# Patient Record
Sex: FEMALE | Race: WHITE | Employment: UNEMPLOYED | URBAN - METROPOLITAN AREA
[De-identification: names, ages, dates, MRNs, and addresses within clinical notes are randomized per-mention and may not be internally consistent; named-entity substitution may affect disease eponyms.]

---

## 2018-11-15 ENCOUNTER — OFFICE VISIT (OUTPATIENT)
Dept: FAMILY MEDICINE CLINIC | Age: 18
End: 2018-11-15
Payer: COMMERCIAL

## 2018-11-15 VITALS
HEIGHT: 61 IN | TEMPERATURE: 98.8 F | DIASTOLIC BLOOD PRESSURE: 60 MMHG | SYSTOLIC BLOOD PRESSURE: 102 MMHG | WEIGHT: 113.8 LBS | HEART RATE: 74 BPM | BODY MASS INDEX: 21.49 KG/M2 | OXYGEN SATURATION: 98 %

## 2018-11-15 DIAGNOSIS — N63.12 LUMP IN UPPER INNER QUADRANT OF RIGHT BREAST: ICD-10-CM

## 2018-11-15 DIAGNOSIS — Z76.89 ENCOUNTER TO ESTABLISH CARE: Primary | ICD-10-CM

## 2018-11-15 PROCEDURE — 99203 OFFICE O/P NEW LOW 30 MIN: CPT | Performed by: NURSE PRACTITIONER

## 2018-11-15 ASSESSMENT — PATIENT HEALTH QUESTIONNAIRE - PHQ9
SUM OF ALL RESPONSES TO PHQ QUESTIONS 1-9: 0
2. FEELING DOWN, DEPRESSED OR HOPELESS: 0
1. LITTLE INTEREST OR PLEASURE IN DOING THINGS: 0
SUM OF ALL RESPONSES TO PHQ QUESTIONS 1-9: 0
SUM OF ALL RESPONSES TO PHQ9 QUESTIONS 1 & 2: 0

## 2018-11-15 NOTE — PROGRESS NOTES
[facility] [place] [address], [address] [place]  [phone]    Najma Hoffman is a 19 y.o. female who presents today for her  medical conditions/complaints as noted below. Najma Hoffman is c/o of Establish Care  . HPI:     HPI   Pt is here to establish    Noticed a lump in her right breast- this has been occurring for a year. she told her gynecologist, she feels it has worsened since she told her. She feels it has gotten bigger and it is tender. No difference in the color or texture of skin. No discharge from nipple. Periods are irregular- always have been irregular. States sometimes she skips a period. Periods last about a week. Heavy bleeding when they start and then a moderate amt. Not using any birth control. Not currently sexually active. Went to [employer], did not graduate- working on getting her diploma right now online. Diet ok  Not currently working      Nursing note reviewed and discussed with patient. Patient's medications, allergies, past medical, surgical, social and family histories were reviewed and updated asappropriate. No current outpatient prescriptions on file. No current facility-administered medications for this visit. Past Medical History:   Diagnosis Date    Allergic rhinitis     Anxiety     Asthma     Depression       History reviewed. No pertinent surgical history. Family History   Problem Relation Age of Onset    Asthma Mother     Diabetes Mother     Cancer Maternal Aunt     Cancer Maternal Grandfather         lung    Cancer Paternal Grandmother         lung     Social History   Substance Use Topics    Smoking status: Current Every Day Smoker     Packs/day: 1.00     Years: 3.00    Smokeless tobacco: Never Used    Alcohol use No      No Known Allergies    Subjective:      Review of Systems   Constitutional: Negative for activity change, appetite change, chills, fatigue and fever.    HENT: Negative for

## 2018-11-25 ASSESSMENT — ENCOUNTER SYMPTOMS
DIARRHEA: 0
BLOOD IN STOOL: 0
ABDOMINAL PAIN: 0
SINUS PRESSURE: 0
CONSTIPATION: 0
CHEST TIGHTNESS: 0
EYE DISCHARGE: 0
COUGH: 0
SHORTNESS OF BREATH: 0
RHINORRHEA: 0

## 2018-11-29 DIAGNOSIS — N63.12 LUMP IN UPPER INNER QUADRANT OF RIGHT BREAST: ICD-10-CM

## 2018-12-24 RX ORDER — ALBUTEROL SULFATE 90 UG/1
2 AEROSOL, METERED RESPIRATORY (INHALATION) EVERY 4 HOURS PRN
Qty: 1 INHALER | Refills: 1 | Status: SHIPPED | OUTPATIENT
Start: 2018-12-24 | End: 2019-01-10 | Stop reason: SDUPTHER

## 2019-01-10 ENCOUNTER — OFFICE VISIT (OUTPATIENT)
Dept: FAMILY MEDICINE CLINIC | Age: 19
End: 2019-01-10
Payer: COMMERCIAL

## 2019-01-10 ENCOUNTER — HOSPITAL ENCOUNTER (OUTPATIENT)
Age: 19
Setting detail: SPECIMEN
Discharge: HOME | End: 2019-01-10
Payer: COMMERCIAL

## 2019-01-10 VITALS
BODY MASS INDEX: 20.47 KG/M2 | HEART RATE: 89 BPM | HEIGHT: 61 IN | SYSTOLIC BLOOD PRESSURE: 106 MMHG | DIASTOLIC BLOOD PRESSURE: 70 MMHG | WEIGHT: 108.4 LBS | OXYGEN SATURATION: 98 % | TEMPERATURE: 98.6 F

## 2019-01-10 DIAGNOSIS — N93.9 VAGINAL SPOTTING: Primary | ICD-10-CM

## 2019-01-10 DIAGNOSIS — B96.89 BACTERIAL VAGINOSIS: Primary | ICD-10-CM

## 2019-01-10 DIAGNOSIS — N89.8 VAGINAL DISCHARGE: ICD-10-CM

## 2019-01-10 DIAGNOSIS — B37.9 YEAST INFECTION: ICD-10-CM

## 2019-01-10 DIAGNOSIS — N76.0 BACTERIAL VAGINOSIS: Primary | ICD-10-CM

## 2019-01-10 LAB
BILIRUBIN, POC: NEGATIVE
BLOOD URINE, POC: ABNORMAL
CLARITY, POC: CLEAR
COLOR, POC: YELLOW
CONTROL: NORMAL
DIRECT EXAM: ABNORMAL
GLUCOSE URINE, POC: NEGATIVE
KETONES, POC: NEGATIVE
LEUKOCYTE EST, POC: NEGATIVE
Lab: ABNORMAL
NITRITE, POC: NEGATIVE
PH, POC: 7
PREGNANCY TEST URINE, POC: NEGATIVE
PROTEIN, POC: NEGATIVE
SPECIFIC GRAVITY, POC: 1.01
SPECIMEN DESCRIPTION: ABNORMAL
STATUS: ABNORMAL
UROBILINOGEN, POC: 0.2

## 2019-01-10 PROCEDURE — 81025 URINE PREGNANCY TEST: CPT | Performed by: NURSE PRACTITIONER

## 2019-01-10 PROCEDURE — 81003 URINALYSIS AUTO W/O SCOPE: CPT | Performed by: NURSE PRACTITIONER

## 2019-01-10 PROCEDURE — 99213 OFFICE O/P EST LOW 20 MIN: CPT | Performed by: NURSE PRACTITIONER

## 2019-01-10 RX ORDER — METRONIDAZOLE 500 MG/1
500 TABLET ORAL 2 TIMES DAILY
Qty: 14 TABLET | Refills: 0 | Status: SHIPPED | OUTPATIENT
Start: 2019-01-10 | End: 2019-01-17

## 2019-01-10 RX ORDER — FLUCONAZOLE 150 MG/1
TABLET ORAL
Qty: 2 TABLET | Refills: 0 | Status: SHIPPED | OUTPATIENT
Start: 2019-01-10 | End: 2019-02-15 | Stop reason: ALTCHOICE

## 2019-01-10 ASSESSMENT — ENCOUNTER SYMPTOMS
ABDOMINAL PAIN: 1
CONSTIPATION: 0
BACK PAIN: 0
VOMITING: 0
NAUSEA: 0
DIARRHEA: 0
SORE THROAT: 0

## 2019-01-11 LAB
C TRACH DNA GENITAL QL NAA+PROBE: NEGATIVE
N. GONORRHOEAE DNA: NEGATIVE

## 2019-01-11 RX ORDER — ALBUTEROL SULFATE 90 UG/1
2 AEROSOL, METERED RESPIRATORY (INHALATION) EVERY 4 HOURS PRN
Qty: 1 INHALER | Refills: 2 | Status: SHIPPED | OUTPATIENT
Start: 2019-01-11 | End: 2021-01-27 | Stop reason: SDUPTHER

## 2019-02-15 ENCOUNTER — OFFICE VISIT (OUTPATIENT)
Dept: FAMILY MEDICINE CLINIC | Age: 19
End: 2019-02-15
Payer: COMMERCIAL

## 2019-02-15 VITALS
BODY MASS INDEX: 19.86 KG/M2 | SYSTOLIC BLOOD PRESSURE: 112 MMHG | DIASTOLIC BLOOD PRESSURE: 74 MMHG | OXYGEN SATURATION: 98 % | HEART RATE: 108 BPM | WEIGHT: 105.8 LBS | TEMPERATURE: 98.7 F | RESPIRATION RATE: 16 BRPM

## 2019-02-15 DIAGNOSIS — R68.89 FLU-LIKE SYMPTOMS: ICD-10-CM

## 2019-02-15 DIAGNOSIS — K52.9 ACUTE GASTROENTERITIS: Primary | ICD-10-CM

## 2019-02-15 DIAGNOSIS — M26.629 TMJ PAIN DYSFUNCTION SYNDROME: ICD-10-CM

## 2019-02-15 LAB
INFLUENZA A ANTIBODY: NEGATIVE
INFLUENZA B ANTIBODY: NORMAL

## 2019-02-15 PROCEDURE — 87804 INFLUENZA ASSAY W/OPTIC: CPT | Performed by: INTERNAL MEDICINE

## 2019-02-15 PROCEDURE — 99213 OFFICE O/P EST LOW 20 MIN: CPT | Performed by: INTERNAL MEDICINE

## 2019-02-15 RX ORDER — ONDANSETRON 4 MG/1
4 TABLET, ORALLY DISINTEGRATING ORAL EVERY 8 HOURS PRN
Qty: 20 TABLET | Refills: 0 | Status: SHIPPED | OUTPATIENT
Start: 2019-02-15 | End: 2021-02-08

## 2019-02-15 ASSESSMENT — ENCOUNTER SYMPTOMS
BLOATING: 0
ABDOMINAL PAIN: 1
FLATUS: 0
DIARRHEA: 1
COUGH: 0
VOMITING: 1

## 2021-01-27 ENCOUNTER — OFFICE VISIT (OUTPATIENT)
Dept: FAMILY MEDICINE CLINIC | Age: 21
End: 2021-01-27
Payer: MEDICAID

## 2021-01-27 VITALS
WEIGHT: 145 LBS | BODY MASS INDEX: 27.38 KG/M2 | HEIGHT: 61 IN | DIASTOLIC BLOOD PRESSURE: 60 MMHG | OXYGEN SATURATION: 97 % | HEART RATE: 83 BPM | TEMPERATURE: 97.2 F | SYSTOLIC BLOOD PRESSURE: 100 MMHG

## 2021-01-27 DIAGNOSIS — N92.6 IRREGULAR MENSES: ICD-10-CM

## 2021-01-27 DIAGNOSIS — J45.20 MILD INTERMITTENT ASTHMA WITHOUT COMPLICATION: ICD-10-CM

## 2021-01-27 DIAGNOSIS — M54.50 LUMBAR PAIN: Primary | ICD-10-CM

## 2021-01-27 PROCEDURE — 99213 OFFICE O/P EST LOW 20 MIN: CPT | Performed by: NURSE PRACTITIONER

## 2021-01-27 RX ORDER — ALBUTEROL SULFATE 90 UG/1
2 AEROSOL, METERED RESPIRATORY (INHALATION) EVERY 4 HOURS PRN
Qty: 1 INHALER | Refills: 2 | Status: SHIPPED | OUTPATIENT
Start: 2021-01-27 | End: 2021-10-21 | Stop reason: SDUPTHER

## 2021-01-27 SDOH — ECONOMIC STABILITY: TRANSPORTATION INSECURITY
IN THE PAST 12 MONTHS, HAS LACK OF TRANSPORTATION KEPT YOU FROM MEETINGS, WORK, OR FROM GETTING THINGS NEEDED FOR DAILY LIVING?: NO

## 2021-01-27 SDOH — ECONOMIC STABILITY: INCOME INSECURITY: HOW HARD IS IT FOR YOU TO PAY FOR THE VERY BASICS LIKE FOOD, HOUSING, MEDICAL CARE, AND HEATING?: NOT HARD AT ALL

## 2021-01-27 SDOH — ECONOMIC STABILITY: FOOD INSECURITY: WITHIN THE PAST 12 MONTHS, THE FOOD YOU BOUGHT JUST DIDN'T LAST AND YOU DIDN'T HAVE MONEY TO GET MORE.: NEVER TRUE

## 2021-01-27 SDOH — ECONOMIC STABILITY: FOOD INSECURITY: WITHIN THE PAST 12 MONTHS, YOU WORRIED THAT YOUR FOOD WOULD RUN OUT BEFORE YOU GOT MONEY TO BUY MORE.: NEVER TRUE

## 2021-01-27 ASSESSMENT — ENCOUNTER SYMPTOMS
COUGH: 0
SHORTNESS OF BREATH: 0

## 2021-01-27 ASSESSMENT — PATIENT HEALTH QUESTIONNAIRE - PHQ9
SUM OF ALL RESPONSES TO PHQ9 QUESTIONS 1 & 2: 0
SUM OF ALL RESPONSES TO PHQ QUESTIONS 1-9: 0

## 2021-01-27 NOTE — PROGRESS NOTES
[address] [place]  [phone]    Rhonda Bartlett is a 23 y.o. female who presents today for her  medical conditions/complaints as noted below. Rhonda Bartlett is c/o of Medication Refill    HPI:     HPI  Asthma:  Current treatment includes beta agonist inhalers. Using preventive medication(s) consistently: not applicable. Residual symptoms: none. Patient denies any other symptoms. She requires her rescue inhaler 2 time(s) per month. Pt has been having back pain for a year. She went to the er last marhc, she had an mri that showed mild central canal stenosis at l4-l5. She had seen a neurosurgeon but states their office is closed d/t covid. She is still having pain but it is not as bad as it was. She would like a referral to a new ob/gyn. Nursing note reviewed and discussed with patient. Patient's medications, allergies, past medical, surgical, social and family histories were reviewed and updated asappropriate. Current Outpatient Medications   Medication Sig Dispense Refill    albuterol sulfate HFA (PROVENTIL HFA) 108 (90 Base) MCG/ACT inhaler Inhale 2 puffs into the lungs every 4 hours as needed for Wheezing 1 Inhaler 2    ondansetron (ZOFRAN ODT) 4 MG disintegrating tablet Take 1 tablet by mouth every 8 hours as needed for Nausea or Vomiting (Patient not taking: Reported on 1/27/2021) 20 tablet 0     No current facility-administered medications for this visit. Past Medical History:   Diagnosis Date    Allergic rhinitis     Anxiety     Asthma     Depression       No past surgical history on file.   Family History   Problem Relation Age of Onset    Asthma Mother     Diabetes Mother     Cancer Maternal Aunt     Cancer Maternal Grandfather         lung    Cancer Paternal Grandmother         lung     Social History     Tobacco Use    Smoking status: Current Every Day Smoker     Packs/day: 1.00     Years: 3.00     Pack voiced understanding. Reviewed health maintenance. Instructed to continue current medications, diet andexercise. 1.  Dori Areas received counseling on the following healthy behaviors: nutrition, exercise and medication adherence  2. Patient given educationalmaterials when available - see patient instructions when applicable  3. Discussed use, benefit, and side effects of prescribed medications. Barriersto medication compliance addressed. All patient questions answered. Pt voiced understanding. 4.  Reviewed prior labs and health maintenance when applicable. 5.  Continue current medications, diet and exercise. CompletedRefills   Requested Prescriptions     Signed Prescriptions Disp Refills    albuterol sulfate HFA (PROVENTIL HFA) 108 (90 Base) MCG/ACT inhaler 1 Inhaler 2     Sig: Inhale 2 puffs into the lungs every 4 hours as needed for Wheezing       This note was transcribed using dictation with Dragon services. Efforts were made to correct any errors but some words may be misinterpreted.     Electronically signed by Donny Mullen CNP on 1/27/2021 at 8:30 AM

## 2021-01-27 NOTE — PROGRESS NOTES
Patient is present for med refills    Patient states she has been having lower back pain  States she saw a specialist for this and was told to get xrays and MRI (in care everywhere)  States this has been going on for months

## 2021-02-03 ENCOUNTER — HOSPITAL ENCOUNTER (OUTPATIENT)
Dept: PHYSICAL THERAPY | Age: 21
Setting detail: THERAPIES SERIES
Discharge: HOME | End: 2021-02-03
Payer: COMMERCIAL

## 2021-02-03 NOTE — FLOWSHEET NOTE
[x] [facility] ([facility]) [facility] &  Therapy  [address]    [phone]  F: [phone]   [] [address]  [facility] [address]   [address]  P: [phone]  F: [phone]  [] [address] &  Therapy  [address]  P: [phone]  F: [phone] [] [address]  P: [phone]  F: [phone]  [] [address]  [address] [facility] [address]   [address]   [place]: [phone]  F: [phone]   [] [facility]  [address] [facility] [address]  [place]: [phone]   F: [phone]     Physical Therapy Cancel/No Show note    Date: 2/3/2021  Patient: Shankar De La Garza  : 2000  MRN: [id number]    Cancels/No Shows to date:     For today's appointment patient:    [x]  Cancelled    [] Rescheduled appointment    [] No-show     Reason given by patient:    []  Patient ill    []  Conflicting appointment    [] No transportation      [] Conflict with work    [] No reason given    [] Weather related    [] COVID-19    [x] Other:      Comments:  Pt.  Has Theramatrix which is OON    [] Next appointment was confirmed    Electronically signed by: Belgica Mohr

## 2021-02-08 ENCOUNTER — OFFICE VISIT (OUTPATIENT)
Dept: OBGYN CLINIC | Age: 21
End: 2021-02-08
Payer: COMMERCIAL

## 2021-02-08 VITALS
SYSTOLIC BLOOD PRESSURE: 124 MMHG | DIASTOLIC BLOOD PRESSURE: 84 MMHG | WEIGHT: 146 LBS | HEIGHT: 61 IN | BODY MASS INDEX: 27.56 KG/M2

## 2021-02-08 DIAGNOSIS — N63.0 BREAST LUMP IN FEMALE: ICD-10-CM

## 2021-02-08 DIAGNOSIS — N64.4 BREAST PAIN IN FEMALE: Primary | ICD-10-CM

## 2021-02-08 DIAGNOSIS — Z76.89 ENCOUNTER TO ESTABLISH CARE WITH NEW DOCTOR: ICD-10-CM

## 2021-02-08 PROCEDURE — 99213 OFFICE O/P EST LOW 20 MIN: CPT | Performed by: STUDENT IN AN ORGANIZED HEALTH CARE EDUCATION/TRAINING PROGRAM

## 2021-02-08 NOTE — PROGRESS NOTES
[facility] OB/GYN   [facility] [address] [address] [facility], [facility] [address]    Problem Visit      Jaimee Rojas  2023                       Primary Care Physician: SHANE Mari CNP    CC:   Chief Complaint   Patient presents with   [facility] Establish Care    Breast Pain     Both breasts tender when touched, L has a small lump and nothing noticible on R breast. This has been going for a while. HPI: Jaimee Rojas is a 23 y.o. female [facility] Patient's last menstrual period was 2021. The patient was seen and examined. She is here for breast discomfort and is complaining of a breast lump on her left breast she noticed about 2 months ago. Patient has a history of breast discomfort in the past and has had negative ultrasound results. Patient denies any nipple discharge. Patient had an x-ray done a few months ago which was negative. Explained to patient that this discomfort may also be due to costochondritis. Recommend Motrin and Tylenol as needed for discomfort.       REVIEW OF SYSTEMS:  Constitutional: negative fever, negative chills  HEENT: negative visual disturbances, negative headaches  Respiratory: negative dyspnea, negative cough  Cardiovascular: negative chest pain,  negative palpitations  Gastrointestinal: negative abdominal pain, negative RUQ pain, negative N/V, negative diarrhea, negative constipation  Genitourinary: negative dysuria, negative vaginal discharge  Dermatological: negative rash  Hematologic: negative bruising  Immunologic/Lymphatic: negative recent illness, negative recent sick contact  Musculoskeletal: negative back pain, negative myalgias, negative arthralgias, positive musculoskeletal chest pain and breast pain  Neurological:  negative dizziness, negative weakness  Behavior/Psych: negative depression, negative anxiety    OBSTETRICAL HISTORY:  OB History    Para Term  AB Living   0 0 0 0 0 0   SAB TAB Ectopic Molar Multiple Live Births   0 0 0 0 0 0 PAST MEDICAL HISTORY:      Diagnosis Date    Allergic rhinitis     Anxiety     Asthma     Depression        PAST SURGICAL HISTORY:                                                                    Procedure Laterality Date    TONSILLECTOMY         MEDICATIONS:  Current Outpatient Medications   Medication Sig Dispense Refill    albuterol sulfate HFA (PROVENTIL HFA) 108 (90 Base) MCG/ACT inhaler Inhale 2 puffs into the lungs every 4 hours as needed for Wheezing 1 Inhaler 2     No current facility-administered medications for this visit. ALLERGIES:   Allergies as of 02/08/2021    (No Known Allergies)                                   VITALS:  Vitals:    02/08/21 1133   BP: 124/84   Position: Sitting   Cuff Size: Medium Adult   Weight: 146 lb (66.2 kg)   Height: 5' 1\" (1.549 m)                                                                                                                                                                    PHYSICAL EXAM:   General Appearance: Appears healthy. Alert; in no acute distress. Pleasant. Skin: Skin color, texture, turgor normal. No rashes or lesions. HEENT: normocephalic and atraumatic, Thyroid normal to inspection and palpation  Respiratory: Normal expansion. Clear to auscultation. No rales, rhonchi, or wheezing. Cardiovascular: normal rate, normal S1 and S2, no gallops, intact distal pulses and no carotid bruits  Breast:  (Chest): normal appearance, tenderness to palpation in bilateral breasts. Roughly 1 cm mobile mass palpated around 10 O'clock position of left breast.  Abdomen: soft, non-tender, non-distended, no right upper quadrant tenderness and no CVA tenderness  Pelvic Exam: Not indicated  Rectal Exam: exam declined by patient  Musculoskeletal: no gross abnormalities  Extremities: non-tender BLE and non-edematous  Psych:  oriented to time, place and person       DATA:  No results found for this visit on 02/08/21.     ASSESSMENT & PLAN:    Clarice Maguire Magalis Slater is a 23 y.o. female Yuriy Mallory Patient's last menstrual period was 01/08/2021. Breast pain/Breast lump   - Bilateral breast US ordered   - Motrin/Tylenol for discomfort    Patient Active Problem List    Diagnosis Date Noted    Breast pain in female 02/08/2021    Breast lump in female 02/08/2021       Return in about 3 months (around 5/8/2021) for Annual.    Counseling Completed:    Discussed need for repeat pap as per American Society for Colposcopy and Cervical Pathology guidelines. Discussed need for mammograms every 1 year, If >44 yo and last mammogram was negative. Discussed Calcium and Vitamin D dosing. Discussed need for colonoscopy screening as well as onset for bone density testing. Discussed birth control and barrier recommendations. Discussed STD counseling and prevention. Discussed Gardisil counseling for all patients 10-35 yo. Hereditary Breast, Ovarian, Colon and Uterine Cancer screening discussed. Tobacco & Secondary smoke risks discussed; with recommendation for cessation and avoidance. Routine health maintenance per patients PCP discussed. Patient was seen with total face to face time of 15 minutes. More than 50% of this visit was on counseling and education regarding her diagnose(s) as listed below and options. She was also counseled on her preventative health maintenance recommendations and follow-up. Diagnosis Orders   1. Breast pain in female  Franny 47 RIGHT   2. Encounter to establish care with new doctor     3.  Breast lump in female           Marisel Estrada, [address], [address]  2/8/2021, 12:15 PM

## 2021-02-23 ENCOUNTER — HOSPITAL ENCOUNTER (OUTPATIENT)
Dept: WOMENS IMAGING | Age: 21
Discharge: HOME | End: 2021-02-25
Payer: COMMERCIAL

## 2021-02-23 DIAGNOSIS — N64.4 BREAST PAIN IN FEMALE: ICD-10-CM

## 2021-02-23 PROCEDURE — 76642 ULTRASOUND BREAST LIMITED: CPT

## 2021-10-21 ENCOUNTER — OFFICE VISIT (OUTPATIENT)
Dept: FAMILY MEDICINE CLINIC | Age: 21
End: 2021-10-21
Payer: COMMERCIAL

## 2021-10-21 ENCOUNTER — HOSPITAL ENCOUNTER (OUTPATIENT)
Age: 21
Setting detail: SPECIMEN
Discharge: HOME | End: 2021-10-21
Payer: COMMERCIAL

## 2021-10-21 VITALS
BODY MASS INDEX: 29.55 KG/M2 | DIASTOLIC BLOOD PRESSURE: 70 MMHG | SYSTOLIC BLOOD PRESSURE: 110 MMHG | OXYGEN SATURATION: 97 % | WEIGHT: 156.4 LBS | HEART RATE: 89 BPM

## 2021-10-21 DIAGNOSIS — J45.20 MILD INTERMITTENT ASTHMA WITHOUT COMPLICATION: ICD-10-CM

## 2021-10-21 DIAGNOSIS — R30.0 DYSURIA: Primary | ICD-10-CM

## 2021-10-21 DIAGNOSIS — R30.0 DYSURIA: ICD-10-CM

## 2021-10-21 DIAGNOSIS — J30.1 SEASONAL ALLERGIC RHINITIS DUE TO POLLEN: ICD-10-CM

## 2021-10-21 LAB
BILIRUBIN, POC: ABNORMAL
BLOOD URINE, POC: ABNORMAL
CLARITY, POC: ABNORMAL
COLOR, POC: ABNORMAL
GLUCOSE URINE, POC: ABNORMAL
KETONES, POC: ABNORMAL
LEUKOCYTE EST, POC: ABNORMAL
NITRITE, POC: ABNORMAL
PH, POC: 6.5
PROTEIN, POC: 30
SPECIFIC GRAVITY, POC: 1.02
UROBILINOGEN, POC: 0.2

## 2021-10-21 PROCEDURE — 81002 URINALYSIS NONAUTO W/O SCOPE: CPT | Performed by: NURSE PRACTITIONER

## 2021-10-21 PROCEDURE — 99213 OFFICE O/P EST LOW 20 MIN: CPT | Performed by: NURSE PRACTITIONER

## 2021-10-21 RX ORDER — ALBUTEROL SULFATE 90 UG/1
2 AEROSOL, METERED RESPIRATORY (INHALATION) EVERY 4 HOURS PRN
Qty: 1 EACH | Refills: 5 | Status: SHIPPED | OUTPATIENT
Start: 2021-10-21 | End: 2022-03-18

## 2021-10-21 RX ORDER — NITROFURANTOIN 25; 75 MG/1; MG/1
100 CAPSULE ORAL 2 TIMES DAILY
Qty: 14 CAPSULE | Refills: 0 | Status: SHIPPED | OUTPATIENT
Start: 2021-10-21 | End: 2021-10-28

## 2021-10-21 RX ORDER — CETIRIZINE HYDROCHLORIDE 10 MG/1
10 TABLET ORAL DAILY
Qty: 30 TABLET | Refills: 3 | Status: SHIPPED | OUTPATIENT
Start: 2021-10-21 | End: 2021-11-20

## 2021-10-21 ASSESSMENT — ENCOUNTER SYMPTOMS
SINUS PRESSURE: 0
ABDOMINAL PAIN: 0
SHORTNESS OF BREATH: 0
BACK PAIN: 0
NAUSEA: 0
SINUS PAIN: 0
CHEST TIGHTNESS: 0
VOMITING: 0
DIARRHEA: 0
EYE PAIN: 0
CONSTIPATION: 0
COLOR CHANGE: 0

## 2021-10-21 NOTE — PROGRESS NOTES
Allergic rhinitis     Anxiety     Asthma     Depression       Past Surgical History:   Procedure Laterality Date    TONSILLECTOMY       Family History   Problem Relation Age of Onset    Asthma Mother     Diabetes Mother     Cancer Maternal Aunt     Cancer Maternal Grandfather         lung    Cancer Paternal Grandmother         lung     Social History     Tobacco Use    Smoking status: Current Every Day Smoker     Packs/day: 1.00     Years: 3.00     Pack years: 3.00    Smokeless tobacco: Never Used   Substance Use Topics    Alcohol use: No      No Known Allergies    Subjective:      Review of Systems   Constitutional: Negative for activity change, chills, fatigue and unexpected weight change. HENT: Negative for hearing loss, postnasal drip, sinus pressure and sinus pain. Eyes: Negative for pain and visual disturbance. Respiratory: Negative for chest tightness and shortness of breath. Cardiovascular: Negative for chest pain and palpitations. Gastrointestinal: Negative for abdominal pain, constipation, diarrhea, nausea and vomiting. Endocrine: Negative for polydipsia, polyphagia and polyuria. Genitourinary: Positive for frequency, hematuria and urgency. Negative for difficulty urinating, dysuria, flank pain, vaginal bleeding, vaginal discharge and vaginal pain. Musculoskeletal: Negative for arthralgias, back pain and myalgias. Skin: Negative for color change and rash. Neurological: Negative for dizziness, weakness, light-headedness, numbness and headaches. Psychiatric/Behavioral: Negative for confusion, hallucinations, self-injury, sleep disturbance and suicidal ideas. The patient is not nervous/anxious.       Other pertinent ROS in HPI  Objective:     /70 (Site: Left Upper Arm, Position: Sitting, Cuff Size: Large Adult)   Pulse 89   Wt 156 lb 6.4 oz (70.9 kg)   SpO2 97%   BMI 29.55 kg/m²      Wt Readings from Last 3 Encounters:   10/21/21 156 lb 6.4 oz (70.9 kg)   02/08/21 146 lb (66.2 kg)   01/27/21 145 lb (65.8 kg)     Temp Readings from Last 3 Encounters:   01/27/21 97.2 °F (36.2 °C)   02/15/19 98.7 °F (37.1 °C) (Oral)   01/10/19 98.6 °F (37 °C)     BP Readings from Last 3 Encounters:   10/21/21 110/70   02/08/21 124/84   01/27/21 100/60     Pulse Readings from Last 3 Encounters:   10/21/21 89   01/27/21 83   02/15/19 108       Physical Exam  Vitals reviewed. Constitutional:       Appearance: Normal appearance. HENT:      Head: Normocephalic. Right Ear: Tympanic membrane normal.      Left Ear: Tympanic membrane normal.      Nose: Nose normal.      Mouth/Throat:      Mouth: Mucous membranes are moist.      Pharynx: Oropharynx is clear. Eyes:      Extraocular Movements: Extraocular movements intact. Conjunctiva/sclera: Conjunctivae normal.      Pupils: Pupils are equal, round, and reactive to light. Cardiovascular:      Rate and Rhythm: Normal rate and regular rhythm. Pulses: Normal pulses. Heart sounds: Normal heart sounds. Pulmonary:      Effort: Pulmonary effort is normal.      Breath sounds: Normal breath sounds and air entry. No decreased breath sounds, wheezing, rhonchi or rales. Abdominal:      General: Abdomen is flat. Bowel sounds are normal.      Palpations: Abdomen is soft. Tenderness: There is no right CVA tenderness or left CVA tenderness. Genitourinary:     Rectum: Normal.   Musculoskeletal:         General: Normal range of motion. Cervical back: Normal range of motion. Skin:     General: Skin is warm and dry. Capillary Refill: Capillary refill takes less than 2 seconds. Neurological:      General: No focal deficit present. Mental Status: She is alert and oriented to person, place, and time. Mental status is at baseline. Psychiatric:         Mood and Affect: Mood normal.         Behavior: Behavior normal.         Thought Content:  Thought content normal.         Judgment: Judgment normal.         Lab Review   No visits with results within 2 Month(s) from this visit. Latest known visit with results is:   Office Visit on 02/15/2019   Component Date Value    Influenza A Ab 02/15/2019 negative     Influenza B Ab 02/15/2019 neagtive      Assessment/PLAN   1. Dysuria  -     POCT Urinalysis no Micro  -     nitrofurantoin, macrocrystal-monohydrate, (MACROBID) 100 MG capsule; Take 1 capsule by mouth 2 times daily for 7 days, Disp-14 capsule, R-0Normal  -     Culture, Urine; Future  2. Mild intermittent asthma without complication  -     albuterol sulfate HFA (PROVENTIL HFA) 108 (90 Base) MCG/ACT inhaler; Inhale 2 puffs into the lungs every 4 hours as needed for Wheezing, Disp-1 each, R-5Normal  3. Seasonal allergic rhinitis due to pollen  -     cetirizine (ZYRTEC) 10 MG tablet; Take 1 tablet by mouth daily, Disp-30 tablet, R-3Normal     1. Dysuria: Will send urine out for culture/pregnancy as she is not sure if there is a possibility of pregnancy or not. Follow up pending results. 2. Asthma: Rx for HUONG. She will go home and call office with second inhaler - seems to be some confusion on what she actually takes during night time episodes. Discussed that if she needs to use HUONG so frequently we might consider stepping up therapy to daily ICS. 3. Allergies: Rx for zyrtec.   lifestyle modifications discussed - allergen avoidance, washing pillowcases weekly, no street clothes in bed, washing hands and face when coming in from outside, no pets in bed, no excessive pillows in bed. RTO if symptoms worsen or fail to improve  Pt agreeable with plan      Patient given educational materials - see patientinstructions. Discussed use, benefit, and side effects of prescribed medications. All patient questions answered. Pt voiced understanding. Reviewed health maintenance. Instructed to continue current medications, diet andexercise.     1.  Mercy Maciel received counseling on the following healthy behaviors: nutrition, exercise and medication adherence  2. Patient given educationalmaterials when available - see patient instructions when applicable  3. Discussed use, benefit, and side effects of prescribed medications. Barriersto medication compliance addressed. All patient questions answered. Pt voiced understanding. 4.  Reviewed prior labs and health maintenance when applicable. 5.  Continue current medications, diet and exercise. CompletedRefills   Requested Prescriptions     Signed Prescriptions Disp Refills    albuterol sulfate HFA (PROVENTIL HFA) 108 (90 Base) MCG/ACT inhaler 1 each 5     Sig: Inhale 2 puffs into the lungs every 4 hours as needed for Wheezing    nitrofurantoin, macrocrystal-monohydrate, (MACROBID) 100 MG capsule 14 capsule 0     Sig: Take 1 capsule by mouth 2 times daily for 7 days    cetirizine (ZYRTEC) 10 MG tablet 30 tablet 3     Sig: Take 1 tablet by mouth daily       This note was transcribed using dictation with Dragon services. Efforts were made to correct any errors but some words may be misinterpreted.     Electronically signed by SHANE Araujo NP, CNP on 10/21/2021 at 5:18 PM

## 2021-10-21 NOTE — PROGRESS NOTES
Patient is present complaining of possible UTI  Patient states this started a few days ago  States she does not having burning with urination, but has discomfort

## 2021-10-22 LAB — HCG(URINE) PREGNANCY TEST: NEGATIVE

## 2021-10-23 LAB
CULTURE: ABNORMAL
Lab: ABNORMAL
SPECIMEN DESCRIPTION: ABNORMAL

## 2022-02-07 ENCOUNTER — TELEPHONE (OUTPATIENT)
Dept: FAMILY MEDICINE CLINIC | Age: 22
End: 2022-02-07

## 2022-02-07 NOTE — TELEPHONE ENCOUNTER
----- Message from Jackie Arteaga sent at 2/3/2022  2:31 PM EST -----  Subject: Refill Request    QUESTIONS  Name of Medication? albuterol sulfate HFA (PROVENTIL HFA) 108 (90 Base)   MCG/ACT inhaler  Patient-reported dosage and instructions? Take as needed  How many days do you have left? 0  Preferred Pharmacy? [facility] phone number (if available)? [phone]  Additional Information for Provider? I see an old encounter where this   refill was request but patient has a new phone number. [phone]  ---------------------------------------------------------------------------  --------------  Pascual BOOKER  What is the best way for the office to contact you? OK to leave message on   voicemail  Preferred Call Back Phone Number?  [phone]

## 2022-02-14 ENCOUNTER — OFFICE VISIT (OUTPATIENT)
Dept: FAMILY MEDICINE CLINIC | Age: 22
End: 2022-02-14
Payer: COMMERCIAL

## 2022-02-14 VITALS
OXYGEN SATURATION: 99 % | HEART RATE: 77 BPM | SYSTOLIC BLOOD PRESSURE: 113 MMHG | TEMPERATURE: 97.9 F | DIASTOLIC BLOOD PRESSURE: 59 MMHG

## 2022-02-14 DIAGNOSIS — N92.0 SPOTTING: ICD-10-CM

## 2022-02-14 DIAGNOSIS — N91.2 AMENORRHEA, UNSPECIFIED: Primary | ICD-10-CM

## 2022-02-14 LAB
BILIRUBIN, POC: NEGATIVE
BLOOD URINE, POC: NEGATIVE
CLARITY, POC: CLEAR
COLOR, POC: YELLOW
CONTROL: NORMAL
GLUCOSE URINE, POC: NEGATIVE
KETONES, POC: NEGATIVE
LEUKOCYTE EST, POC: NEGATIVE
NITRITE, POC: NEGATIVE
PH, POC: 7
PREGNANCY TEST URINE, POC: NEGATIVE
PROTEIN, POC: NEGATIVE
SPECIFIC GRAVITY, POC: 1.03
UROBILINOGEN, POC: 0.2

## 2022-02-14 PROCEDURE — 81025 URINE PREGNANCY TEST: CPT

## 2022-02-14 PROCEDURE — 81003 URINALYSIS AUTO W/O SCOPE: CPT

## 2022-02-14 PROCEDURE — 99213 OFFICE O/P EST LOW 20 MIN: CPT

## 2022-02-14 ASSESSMENT — ENCOUNTER SYMPTOMS
NAUSEA: 1
SINUS PRESSURE: 0
ANAL BLEEDING: 0
APNEA: 0
CHEST TIGHTNESS: 0
EYE DISCHARGE: 0
ABDOMINAL PAIN: 1
BACK PAIN: 0
EYE REDNESS: 0
CHOKING: 0
DIARRHEA: 0
STRIDOR: 0
SINUS PAIN: 0
WHEEZING: 0
CONSTIPATION: 0
PHOTOPHOBIA: 0
RHINORRHEA: 0
SHORTNESS OF BREATH: 0
RESPIRATORY NEGATIVE: 1
EYE PAIN: 0
EYE ITCHING: 0
SORE THROAT: 0
COUGH: 0
ABDOMINAL DISTENTION: 0
BLOOD IN STOOL: 0
EYES NEGATIVE: 1
VOMITING: 1
RECTAL PAIN: 0

## 2022-02-14 NOTE — PROGRESS NOTES
[facility] WALK-IN FAMILY MEDICINE  [facility] WALK-IN FAMILY MEDICINE  [address] [address]  Dept: [phone]    Lena Krueger is a 19 y.o. female Established patient, who presents to the walk-in clinic today with conditions/complaints as noted below:    Chief Complaint   Patient presents with    Vaginal Bleeding     onset one week ago, happened with intercourse    Nausea     onsent within the last week, two episodes of vomitting         HPI:     Vaginal Bleeding  The patient's pertinent negatives include no genital itching, genital lesions, genital odor, genital rash, missed menses, pelvic pain, vaginal bleeding or vaginal discharge (spotting). This is a new problem. The current episode started in the past 7 days. The problem occurs intermittently. The problem has been unchanged. The patient is experiencing no pain. She is not pregnant. Associated symptoms include abdominal pain (intermittent, abdominal pressure), nausea and vomiting. Pertinent negatives include no anorexia, back pain, chills, constipation, diarrhea, discolored urine, dysuria, fever, flank pain, frequency, headaches, hematuria, joint pain, joint swelling, painful intercourse, rash, sore throat or urgency. Nothing aggravates the symptoms. She has tried nothing for the symptoms. She is sexually active. It is unknown whether or not her partner has an STD. She uses nothing for contraception. Her menstrual history has been regular. Regular cycle but had irregular menses a couple of years ago. She saw her OB at the time and stated there was no concerns due to her age. She is currently not on birth control, does not use condoms and has no concerns for STI's due to being monogamous with her .     LMP-beginning of January  Last OB GYN- May 2021 (with Joycelyn Bowens)  She took at home pregnancy tests 1-2 weeks ago and they were negative, but has not taken any since spotting/n/v. Past Medical History:   Diagnosis Date    Allergic rhinitis     Anxiety     Asthma     Depression        Current Outpatient Medications   Medication Sig Dispense Refill    albuterol sulfate HFA (PROVENTIL HFA) 108 (90 Base) MCG/ACT inhaler Inhale 2 puffs into the lungs every 4 hours as needed for Wheezing 1 each 5     No current facility-administered medications for this visit. No Known Allergies    Review of Systems:     Review of Systems   Constitutional: Negative. Negative for activity change, appetite change, chills, diaphoresis, fatigue, fever and unexpected weight change. HENT: Negative for congestion, ear pain, postnasal drip, rhinorrhea, sinus pressure, sinus pain, sneezing and sore throat. Eyes: Negative. Negative for photophobia, pain, discharge, redness, itching and visual disturbance. Respiratory: Negative. Negative for apnea, cough, choking, chest tightness, shortness of breath, wheezing and stridor. Cardiovascular: Negative. Negative for chest pain, palpitations and leg swelling. Gastrointestinal: Positive for abdominal pain (intermittent, abdominal pressure), nausea and vomiting. Negative for abdominal distention, anal bleeding, anorexia, blood in stool, constipation, diarrhea and rectal pain. Genitourinary: Positive for vaginal bleeding. Negative for decreased urine volume, difficulty urinating, dyspareunia, dysuria, enuresis, flank pain, frequency, genital sores, hematuria, menstrual problem, missed menses, pelvic pain, urgency, vaginal discharge (spotting) and vaginal pain. Musculoskeletal: Negative for back pain and joint pain. Skin: Negative. Negative for rash. Neurological: Negative. Negative for dizziness, tremors, seizures, syncope, facial asymmetry, speech difficulty, weakness, light-headedness, numbness and headaches. Physical Exam:      BP (!) 113/59 (Site: Right Upper Arm, Position: Sitting, Cuff Size: Large Adult)   Pulse 77   Temp 97.9 °F (36.6 °C) (Infrared)   LMP  (Approximate) Comment: states she had a cycle at the start of January  SpO2 99%   Breastfeeding No Comment: took pregnancy test 2/3 weeks ago    Physical Exam  Vitals reviewed. Constitutional:       Appearance: Normal appearance. She is normal weight. HENT:      Head: Normocephalic. Right Ear: Tympanic membrane, ear canal and external ear normal.      Left Ear: Tympanic membrane, ear canal and external ear normal.      Nose: Nose normal.      Mouth/Throat:      Mouth: Mucous membranes are moist.      Pharynx: Oropharynx is clear. Eyes:      Extraocular Movements: Extraocular movements intact. Conjunctiva/sclera: Conjunctivae normal.      Pupils: Pupils are equal, round, and reactive to light. Cardiovascular:      Rate and Rhythm: Normal rate and regular rhythm. Pulses: Normal pulses. Heart sounds: Normal heart sounds. Pulmonary:      Effort: Pulmonary effort is normal.      Breath sounds: Normal breath sounds and air entry. Abdominal:      General: Abdomen is flat. Bowel sounds are normal.      Palpations: Abdomen is soft. Musculoskeletal:         General: Normal range of motion. Cervical back: Normal range of motion and neck supple. Skin:     General: Skin is warm and dry. Capillary Refill: Capillary refill takes less than 2 seconds. Neurological:      General: No focal deficit present. Mental Status: She is alert and oriented to person, place, and time. Mental status is at baseline. Psychiatric:         Mood and Affect: Mood normal.         Behavior: Behavior normal.         Plan:          1. Amenorrhea, unspecified  -     HCG Qualitative, Serum; Future  -     Follicle Stimulating Hormone; Future  -     Estradiol; Future  -     Prolactin; Future  -     TSH;  Future  -     POCT Urinalysis No Micro (Auto)  -     POCT urine pregnancy  2. Spotting  -     HCG Qualitative, Serum; Future  -     Follicle Stimulating Hormone; Future  -     Estradiol; Future  -     Prolactin; Future  -     POCT Urinalysis No Micro (Auto)  -     POCT urine pregnancy  Labs pending, treatment plan may change pending results. F/u with PCP or OB GYN if symptoms persist.   Patient verbalized understanding. Follow Up Instructions:      Return if symptoms worsen or fail to improve, for SOB, chest pain go to ER. No orders of the defined types were placed in this encounter. Patient and/or parent given educational materials - see patient instructions. Discussed use, benefit, and side effects of prescribed medications. All patient questions answered. Patient and/or parent voiced understanding.       Electronically signed by SHANE Valverde 2/14/2022 at 11:29 AM

## 2022-02-14 NOTE — PATIENT INSTRUCTIONS
Patient Education        Heavy Menstrual Periods: Care Instructions  Overview     With heavy menstrual periods, your bleeding may be heavier or last longer than normal. You may pass large blood clots and have to change sanitary pads or tampons often. Or your periods may last longer than 7 days. Heavy bleeding can be caused by not ovulating regularly. It can also be caused by other problems, such as fibroids (growths that aren't cancer). If you are overweight, you may be more likely to have heavy menstrual periods. But in some cases, there may not be a specific cause for your heavy periods. Your doctor may recommend hormone treatments to slow or stop your periods. If you have a fibroid, your doctor may recommend surgery or other treatments to remove the growth. Because blood loss from heavy periods can make you very tired and weak (anemic), your doctor may recommend that you take extra iron. Follow-up care is a key part of your treatment and safety. Be sure to make and go to all appointments, and call your doctor if you are having problems. It's also a good idea to know your test results and keep a list of the medicines you take. How can you care for yourself at home? · Get plenty of rest.  · Keep a record of your periods. Write down when your period begins and ends and how much flow you have. That means counting the number of pads and tampons you use. Note whether they are soaked. Note any other symptoms. Take this record to your doctor appointments. · Take your medicines exactly as prescribed. Call your doctor if you think you are having a problem with your medicine. · Take pain medicines exactly as directed. ? If the doctor gave you a prescription medicine for pain, take it as prescribed. ? If you are not taking a prescription pain medicine, ask your doctor if you can take an over-the-counter medicine. · Try to reach a healthy weight.  If you are trying to lose weight, do it slowly with your doctor's advice. · If you are taking iron pills:  ? Try to take the pills about 1 hour before or 2 hours after meals. But you may need to take iron with some food to avoid an upset stomach. ? Vitamin C (from food or pills) helps your body absorb iron. Try taking iron pills with a glass of orange juice or other citrus fruit juice. ? Do not take antacids or drink milk or caffeine drinks (such as coffee, tea, or cola) at the same time or within 2 hours of the time that you take your iron. They can make it hard for your body to absorb the iron. ? Iron pills may cause stomach problems, such as heartburn, nausea, diarrhea, constipation, and cramps. Be sure to drink plenty of fluids, and include fruits, vegetables, and fiber in your diet each day. ? If you forget to take an iron pill, do not take a double dose of iron the next time you take a pill. ? Keep iron pills out of the reach of small children. An overdose of iron can be very dangerous. When should you call for help? Call 911 anytime you think you may need emergency care. For example, call if:    · You passed out (lost consciousness). Call your doctor now or seek immediate medical care if:    · You have new or worse belly or pelvic pain.     · You have severe vaginal bleeding.     · You feel dizzy or lightheaded, or you feel like you may faint. Watch closely for changes in your health, and be sure to contact your doctor if:    · You think you may be pregnant.     · Your bleeding gets worse.     · You do not get better as expected. Where can you learn more? Go to https://miriam.Piehole. org and sign in to your [employer] account. Enter F477 in the [employer] box to learn more about \"Heavy Menstrual Periods: Care Instructions. \"     If you do not have an account, please click on the \"Sign Up Now\" link. Current as of: February 11, 2021               Content Version: 13.1  © 4278-5718 Healthwise, Incorporated.    Care instructions adapted under license by [facility] ([facility]). If you have questions about a medical condition or this instruction, always ask your healthcare professional. [address] any warranty or liability for your use of this information. Patient Education        Secondary Amenorrhea: Care Instructions  Overview     Amenorrhea means you do not have menstrual periods. There are two types. Primary amenorrhea means you never start your periods. Secondary amenorrhea means you have had periods, and then they stop, especially for more than 3 months. Even if you don't have periods, you could still get pregnant. You may not know what caused your periods to stop. Possible causes include pregnancy, hormonal changes, and losing or gaining a lot of weight quickly. Some medicines and stress could also cause it. Being active in endurance sports can also cause you to miss your period or stop menstruating. Losing weight or maintaining a low weight in harmful ways could also stop your period. These include dieting too much or binging and purging. But doing these things can lead to eating disorders, amenorrhea, and osteoporosis. If you exercise less or gain a little weight, your periods will probably start again. Your doctor may order tests to find out why your periods have stopped. Treatment depends on the cause. Your doctor may prescribe hormone therapy to help regulate your cycle. This can also help protect against bone loss. Follow-up care is a key part of your treatment and safety. Be sure to make and go to all appointments, and call your doctor if you are having problems. It's also a good idea to know your test results and keep a list of the medicines you take. How can you care for yourself at home? · Eat a healthy, balanced diet. This includes fruits, vegetables, whole grains, proteins, and low-fat dairy products. · Do light exercise, unless your doctor told you not to exercise.   · Use birth control if you do not want to get pregnant. When should you call for help? Call your doctor now or seek immediate medical care if:    · You have severe vaginal bleeding.     · You have new or worse belly or pelvic pain. Watch closely for changes in your health, and be sure to contact your doctor if:    · You have unusual vaginal bleeding.     · You think you might be pregnant.     · You do not get better as expected. Where can you learn more? Go to https://chpesamueleweb.[email or web address] org and sign in to your [employer] account. Enter 53-69-10-18 in the Loc[employer].jp box to learn more about \"Secondary Amenorrhea: Care Instructions. \"     If you do not have an account, please click on the \"Sign Up Now\" link. Current as of: February 11, 2021               Content Version: 13.1  © 6328-3802 Healthwise, Incorporated. Care instructions adapted under license by [facility] ([facility]). If you have questions about a medical condition or this instruction, always ask your healthcare professional. [place] [address] any warranty or liability for your use of this information.

## 2022-03-07 ENCOUNTER — HOSPITAL ENCOUNTER (OUTPATIENT)
Age: 22
Discharge: HOME | End: 2022-03-07
Payer: COMMERCIAL

## 2022-03-07 LAB
ESTRADIOL LEVEL: 21 PG/ML (ref 27–314)
FOLLICLE STIMULATING HORMONE: 6.6 U/L (ref 1.7–21.5)
HCG QUANTITATIVE: <1 IU/L
PROLACTIN: 12.82 UG/L (ref 4.79–23.3)
TSH SERPL DL<=0.05 MIU/L-ACNC: 1.37 MIU/L (ref 0.3–5)

## 2022-03-07 PROCEDURE — 84146 ASSAY OF PROLACTIN: CPT

## 2022-03-07 PROCEDURE — 84702 CHORIONIC GONADOTROPIN TEST: CPT

## 2022-03-07 PROCEDURE — 36415 COLL VENOUS BLD VENIPUNCTURE: CPT

## 2022-03-07 PROCEDURE — 83001 ASSAY OF GONADOTROPIN (FSH): CPT

## 2022-03-07 PROCEDURE — 84443 ASSAY THYROID STIM HORMONE: CPT

## 2022-03-07 PROCEDURE — 82670 ASSAY OF TOTAL ESTRADIOL: CPT

## 2022-03-09 ENCOUNTER — TELEPHONE (OUTPATIENT)
Dept: PRIMARY CARE CLINIC | Age: 22
End: 2022-03-09

## 2022-03-09 DIAGNOSIS — N92.6 IRREGULAR MENSES: Primary | ICD-10-CM

## 2022-03-09 NOTE — TELEPHONE ENCOUNTER
----- Message from [address] sent at 3/8/2022 12:51 PM EST -----  Subject: Referral Request    QUESTIONS   Reason for referral request? Patient is in need of a new OBGYN, the dr she   was referred to she can't get ahold of at all. Has the physician seen you for this condition before? No   Preferred Specialist (if applicable)? Do you already have an appointment scheduled? No  Additional Information for Provider? She did receive results that her   estrogen is rally low and that is why she is requesting  ---------------------------------------------------------------------------  --------------  [address]  What is the best way for the office to contact you? OK to leave message on   voicemail  Preferred Call Back Phone Number?  [phone]

## 2022-03-18 DIAGNOSIS — J45.20 MILD INTERMITTENT ASTHMA WITHOUT COMPLICATION: ICD-10-CM

## 2022-03-18 RX ORDER — ALBUTEROL SULFATE 90 UG/1
AEROSOL, METERED RESPIRATORY (INHALATION)
Qty: 6.7 G | Refills: 2 | Status: SHIPPED | OUTPATIENT
Start: 2022-03-18 | End: 2022-08-30 | Stop reason: SDUPTHER

## 2022-03-18 NOTE — TELEPHONE ENCOUNTER
----- Message from [facility] sent at 3/18/2022  1:54 PM EDT -----  Subject: Refill Request    QUESTIONS  Name of Medication? albuterol sulfate HFA (PROVENTIL HFA) 108 (90 Base)   MCG/ACT inhaler  Patient-reported dosage and instructions? flavia  How many days do you have left? 0  Preferred Pharmacy? [address] [address]  Pharmacy phone number (if available)? [phone]  ---------------------------------------------------------------------------  --------------  CALL BACK INFO  What is the best way for the office to contact you? OK to leave message on   voicemail  Preferred Call Back Phone Number?  [phone]

## 2022-04-19 ENCOUNTER — OFFICE VISIT (OUTPATIENT)
Dept: OBGYN CLINIC | Age: 22
End: 2022-04-19
Payer: COMMERCIAL

## 2022-04-19 VITALS
BODY MASS INDEX: 29.07 KG/M2 | SYSTOLIC BLOOD PRESSURE: 114 MMHG | HEIGHT: 61 IN | DIASTOLIC BLOOD PRESSURE: 70 MMHG | WEIGHT: 154 LBS

## 2022-04-19 DIAGNOSIS — Z01.419 WELL FEMALE EXAM WITH ROUTINE GYNECOLOGICAL EXAM: Primary | ICD-10-CM

## 2022-04-19 DIAGNOSIS — N93.0 BLEEDING AFTER INTERCOURSE: ICD-10-CM

## 2022-04-19 DIAGNOSIS — N92.6 IRREGULAR MENSES: ICD-10-CM

## 2022-04-19 PROCEDURE — 99385 PREV VISIT NEW AGE 18-39: CPT | Performed by: NURSE PRACTITIONER

## 2022-04-19 NOTE — PROGRESS NOTES
History and Physical  [address], [address] [address], [address] [facility] [address] [phone]   Fax [phone]  Boby Prieto  2022              21 y.o. Chief Complaint   Patient presents with   Molly Yan Doctor    Annual Exam       No LMP recorded (lmp unknown). Primary Care Physician: Nellie Luque, APRN - CNP    The patient was seen and examined. She has no chief complaint today and is here for her annual exam.  Her bowels are regular. There are no voiding complaints. She denies any bloating. She denies vaginal discharge and was counseled on STD's and the need for barrier contraception. HPI : Boby Prieto is a 19 y.o. female     Annual exam  Complaining of irregular menses. Patient reports they occur every 1-2 months, lasting 1 week long. Heavy at times. Went to urgent care with irregular bleeding and they checked her hormone levels. Estradiol level 3/7/2022 21. [facility] normal 6. 6. normal TSH, prolactin levels. Having unprotected sex with  since . Thinks she might have had 2 miscarriages - due to heavy bleeding, mucus. Never got positive pregnancy test.    Complaining of spotting, light bleeding after intercourse the past month. Weight gain of 65 pounds in last year.    Hair growth on abdomen.    ________________________________________________________________________  OB History    Para Term  AB Living   2 0 0 0 2 0   SAB IAB Ectopic Molar Multiple Live Births   2 0 0 0 0 0      # Outcome Date GA Lbr Aly/2nd Weight Sex Delivery Anes PTL Lv   2 2022           1 2019             Past Medical History:   Diagnosis Date    Allergic rhinitis     Anxiety     Asthma     Depression                                                                    Past Surgical History:   Procedure Laterality Date    TONSILLECTOMY       Family History   Problem Relation Age of Onset    Asthma Mother  Diabetes Mother     Cancer Maternal Aunt     Cancer Maternal Grandfather         lung    Cancer Paternal Grandmother         lung     Social History     Socioeconomic History    Marital status: Single     Spouse name: Not on file    Number of children: Not on file    Years of education: Not on file    Highest education level: Not on file   Occupational History    Not on file   Tobacco Use    Smoking status: Current Every Day Smoker     Packs/day: 1.00     Years: 3.00     Pack years: 3.00    Smokeless tobacco: Never Used   Vaping Use    Vaping Use: Never used   Substance and Sexual Activity    Alcohol use: No    Drug use: Yes     Types: Marijuana Fernanda Aver)    Sexual activity: Yes     Partners: Female   Other Topics Concern    Not on file   Social History Narrative    Not on file     Social Determinants of Health     Financial Resource Strain:     Difficulty of Paying Living Expenses: Not on file   Food Insecurity:     Worried About Running Out of Food in the Last Year: Not on file    Richie of Food in the Last Year: Not on file   Transportation Needs:     Lack of Transportation (Medical): Not on file    Lack of Transportation (Non-Medical):  Not on file   Physical Activity:     Days of Exercise per Week: Not on file    Minutes of Exercise per Session: Not on file   Stress:     Feeling of Stress : Not on file   Social Connections:     Frequency of Communication with Friends and Family: Not on file    Frequency of Social Gatherings with Friends and Family: Not on file    Attends [employer] Services: Not on file    Active Member of Clubs or Organizations: Not on file    Attends Club or Organization Meetings: Not on file    Marital Status: Not on file   Intimate Partner Violence:     Fear of Current or Ex-Partner: Not on file    Emotionally Abused: Not on file    Physically Abused: Not on file    Sexually Abused: Not on file   Housing Stability:     Unable to Pay for Housing in the Last Year: Not on file    Number of Places Lived in the Last Year: Not on file    Unstable Housing in the Last Year: Not on file       MEDICATIONS:  Current Outpatient Medications   Medication Sig Dispense Refill    albuterol sulfate  (90 Base) MCG/ACT inhaler INHALE TWO PUFFS BY MOUTH EVERY 4 HOURS AS NEEDED FOR WHEEZING 6.7 g 2     No current facility-administered medications for this visit. ALLERGIES:  Allergies as of 04/19/2022    (No Known Allergies)       Symptoms of decreased mood absent  Symptoms of anhedonia absent    **If either question is answered in a  positive fashion then complete the PHQ9 Scoring Evaluation and make the appropriate referral**      Immunization status: stated as current, but no records available. Gynecologic History:  Menarche: 7 yo  Menopause at [age] yo     No LMP recorded (lmp unknown). Sexually Active: Yes    STD History: yes, chlamydia in high school    Permanent Sterilization: No   Reversible Birth Control: No        Hormone Replacement Exposure: No      Genetic Qualified Family History of Breast, Ovarian , Colon or Uterine Cancer: Yes (patient's maternal aunt with breast cancer- unsure of age, paternal uncle - colon cancer- 45s, paternal grandma with breast cancer) patient declines genetic testing. If YES see scanned worksheet. Preventative Health Testing:    Health Maintenance:  Health Maintenance Due   Topic Date Due    Hepatitis C screen  Never done    Varicella vaccine (1 of 2 - 2-dose childhood series) Never done    COVID-19 Vaccine (1) Never done    Pneumococcal 0-64 years Vaccine (1 - PCV) Never done    HPV vaccine (1 - 2-dose series) Never done    HIV screen  Never done    DTaP/Tdap/Td vaccine (1 - Tdap) Never done    Pap smear  Never done    Chlamydia screen  01/10/2020    Depression Screen  01/27/2022       Date of Last Pap Smear: unsure?   Abnormal Pap Smear History: unsure  Colposcopy History:   Date of Last Mammogram: NA  Date of Last Colonoscopy:   Date of Last Bone Density:      ________________________________________________________________________        REVIEW OF SYSTEMS:    yes   A minimum of an eleven point review of systems was completed. Review Of Systems (11 point):  Constitutional: No fever, chills or malaise; No weight change or fatigue  Head and Eyes: No vision changes, Headache, Dizziness or trauma in last 12 months  ENT ROS: No hearing, Tinnitis, sinus or taste problems  Hematological and Lymphatic ROS:No Lymphoma, Von Willebrand's, Hemophillia or Bleeding History  Psych ROS: No Depression, Homicidal thoughts,suicidal thoughts, or anxiety  Breast ROS: No breast abnormalities or lumps  Respiratory ROS: No SOB, Pneumoniae,Cough, or Pulmonary Embolism   Cardiovascular ROS: No Chest Pain with Exertion, Palpitations, Syncope, Edema, Arrhythmia  Gastrointestinal ROS: No Indigestion, Heartburn, Nausea, vomiting, Diarrhea, Constipation,or Bowel Changes; No Bloody Stools or melena  Genito-Urinary ROS: No Dysuria, Hematuria or Nocturia. No Urinary Incontinence or Vaginal Discharge. + irregular menses  Musculoskeletal ROS: No Arthralgia, Arthritis,Gout,Osteoporosis or Rheumatism  Neurological ROS: No CVA, Migraines, Epilepsy, Seizure Hx, or Limb Weakness  Dermatological ROS: No Rash, Itching, Hives, Mole Changes or Cancer                                                                                                                                                                                                                                  PHYSICAL Exam:     Constitutional:  Vitals:    04/19/22 1521   BP: 114/70   Site: Left Upper Arm   Position: Sitting   Cuff Size: Medium Adult   Weight: 154 lb (69.9 kg)   Height: 5' 1\" (1.549 m)       Chaperone for Intimate Exam   Chaperone was offered and accepted as part of the rooming process.  Chaperone: Genesis          General Appearance:   This  is a well Developed, well Nourished, well groomed female. Her BMI was reviewed. Nutritional decision making was discussed. Skin:  There was a Normal Inspection of the skin without rashes or lesions. There were no rashes. (Papular, Maculopapular, Hives, Pustular, Macular)     There were no lesions (Ulcers, Erythema, Abn. Appearing Nevi)            Lymphatic:  No Lymph Nodes were Palpable in the neck , axilla or groin.  0 # Of Lymph Nodes; Location ; Character [Normal]  [Shotty] [Tender] [Enlarged]     Neck and EENT:  The neck was supple. There were no masses   The thyroid was not enlarged and had no masses. Perrla, EOMI B/L, TMI B/L No Abnormalities. Throat inspected-No exudates or Masses, Nares Patent No Masses        Respiratory: The lungs were auscultated and found to be clear. There were no rales, rhonchi or wheezes. There was a good respiratory effort. Cardiovascular: The heart was in a regular rate and rhythm. . No S3 or S4. There was no murmur appreciated. Location, grade, and radiation are not applicable. Extremities: The patients extremities were without calf tenderness, edema, or varicosities. There was full range of motion in all four extremities. Pulses in all four extremities were appreciated and are 2/4. Abdomen: The abdomen was soft and non-tender. There were good bowel sounds in all quadrants and there was no guarding, rebound or rigidity. On evaluation there was no evidence of hepatosplenomegaly and there was no costal vertebral rosenda tenderness bilaterally. No hernias were appreciated. Abdominal Scars: scars C/W previous surgeries    Psych:   The patient had a normal Orientation to: Time, Place, Person, and Situation  There is no Mood / Affect changes    Breast:  (Chest)  normal appearance, no masses or tenderness, No nipple retraction or dimpling, No nipple discharge or bleeding, No axillary or supraclavicular adenopathy, Normal to palpation without dominant masses  Self breast exams were reviewed in detail. Literature was given. Pelvic Exam:  External genitalia: normal general appearance  Urinary system: urethral meatus normal  Vaginal: normal mucosa without prolapse or lesions  Cervix: normal appearance  Adnexa: normal bimanual exam  Uterus: normal single, nontender    Rectal Exam:  exam declined by patient          Musculosk:  Normal Gait and station was noted. Digits were evaluated without abnormal findings. Range of motion, stability and strength were evaluated and found to be appropriate for the patients age. ASSESSMENT:      19 y.o. Annual   Diagnosis Orders   1. Well female exam with routine gynecological exam  PAP SMEAR   2. Irregular menses  US PELVIS COMPLETE    US NON OB TRANSVAGINAL    Follicle Stimulating Hormone    Luteinizing Hormone    Glucose, Fasting    Insulin, total    Hemoglobin A1C    BUN & Creatinine    AST    ALT    Estradiol    CBC with Auto Differential    TSH with Reflex    HCG, Quantitative, Pregnancy    Protime-INR    APTT    Progesterone   3. Bleeding after intercourse  [facility] NON OB TRANSVAGINAL    Vaginitis DNA Probe    C.trachomatis N.gonorrhoeae DNA          Chief Complaint   Patient presents with    Established New Doctor    Annual Exam          Past Medical History:   Diagnosis Date    Allergic rhinitis     Anxiety     Asthma     Depression          Patient Active Problem List    Diagnosis Date Noted    Breast pain in female 02/08/2021    Breast lump in female 02/08/2021          Hereditary Breast, Ovarian, Colon and Uterine Cancer screening Done. Tobacco & Secondary smoke risks reviewed; instructed on cessation and avoidance      Counseling Completed:  Preventative Health Recommendations and Follow up. The patient was informed of the recommended preventative health recommendations. 1. Annuals every year; Cytology collections per prevailing guidelines.    2. Mammograms begin every year at 37 yo if no abnormalities are found and no family history. 3. Bone density studies every 2-3 years. Begin at 71 yo. If no fracture history or osteoporosis family history. (significant). 4. Colonoscopy begin at 40 yo. Repeat every ten years if negative and no family history. 5. Calcium of 2902-8545 mg/day in split dosing  6. Vitamin D 400-800 IU/day  7. All other preventative health recommendations will be managed by the patients Primary care physician. PLAN:  Return in about 4 weeks (around 5/17/2022) for physician/ irregular menses. Pap smear and vaginal cultures collected. Pelvic ultrasound ordered. Lab slips given. Repeat Annual every 1 year  Cervical Cytology Evaluation begins at 24years old. If Negative Cytology, Follow-up screening per current guidelines. Mammograms every 1 year. If 37 yo and last mammogram was negative. Calcium and Vitamin D dosing reviewed. Colonoscopy screening reviewed as well as onset for bone density testing. Birth control and barrier recommendations discussed. STD counseling and prevention reviewed. Gardisil counseling completed for all patients 10-37 yo. Routine health maintenance per patients PCP. Orders Placed This Encounter   Procedures    Vaginitis DNA Probe     Standing Status:   Future     Standing Expiration Date:   4/19/2023    C.trachomatis N.gonorrhoeae DNA     Standing Status:   Future     Standing Expiration Date:   5/19/2022    US PELVIS COMPLETE     Standing Status:   Future     Standing Expiration Date:   7/19/2022     Order Specific Question:   Reason for exam:     Answer:   irregular menses    US NON OB TRANSVAGINAL     Standing Status:   Future     Standing Expiration Date:   10/19/2022     Order Specific Question:   Reason for exam:     Answer:   irregular menses    PAP SMEAR     Patient History:    No LMP recorded (lmp unknown).   OBGYN Status: Having periods  Past Surgical History:  No date: TONSILLECTOMY      Social History    Tobacco Use      Smoking status: Current Every Day Smoker        Packs/day: 1.00        Years: 3.00        Pack years: 3      Smokeless tobacco: Never Used       Standing Status:   Future     Standing Expiration Date:   4/19/2023     Order Specific Question:   Collection Type     Answer: Thin Prep     Order Specific Question:   Prior Abnormal Pap Test     Answer:   No     Order Specific Question:   Screening or Diagnostic     Answer:   Screening     Order Specific Question:   HPV Requested? Answer:   N/A     Order Specific Question:   High Risk Patient     Answer:   N/A    Follicle Stimulating Hormone     Standing Status:   Future     Standing Expiration Date:   4/19/2023    Luteinizing Hormone     Standing Status:   Future     Standing Expiration Date:   4/19/2023    Glucose, Fasting     Standing Status:   Future     Standing Expiration Date:   4/19/2023    Insulin, total     Patient must be fasting for 12-14 hrs     Standing Status:   Future     Standing Expiration Date:   5/19/2022    Hemoglobin A1C     Standing Status:   Future     Standing Expiration Date:   4/19/2023    BUN & Creatinine     Standing Status:   Future     Standing Expiration Date:   4/19/2023    AST     Standing Status:   Future     Standing Expiration Date:   4/19/2023    ALT     Standing Status:   Future     Standing Expiration Date:   4/19/2023    Estradiol     Standing Status:   Future     Standing Expiration Date:   4/19/2023    CBC with Auto Differential     Standing Status:   Future     Standing Expiration Date:   4/19/2023    TSH with Reflex     Standing Status:   Future     Standing Expiration Date:   4/19/2023    HCG, Quantitative, Pregnancy     Standing Status:   Future     Standing Expiration Date:   4/19/2023    Protime-INR     Standing Status:   Future     Standing Expiration Date:   4/19/2023     Order Specific Question:   Daily Coumadin Dose?      Answer:   N    APTT     Standing Status:   Future     Standing Expiration Date:   4/19/2023     Order Specific Question:   Daily Heparin Dose? Answer:   Chapis Petite Progesterone     Standing Status:   Future     Standing Expiration Date:   4/19/2023           The patient, Barbi Carrizales is a 19 y.o. female, was seen with a total time spent of 20 minutes for the visit on this date of service by the E/M provider. The time component had both face to face and non face to face time spent in determining the total time component. Counseling and education regarding her diagnosis listed below and her options regarding those diagnoses were also included in determining her time component. Diagnosis Orders   1. Well female exam with routine gynecological exam  PAP SMEAR   2. Irregular menses  US PELVIS COMPLETE    US NON OB TRANSVAGINAL    Follicle Stimulating Hormone    Luteinizing Hormone    Glucose, Fasting    Insulin, total    Hemoglobin A1C    BUN & Creatinine    AST    ALT    Estradiol    CBC with Auto Differential    TSH with Reflex    HCG, Quantitative, Pregnancy    Protime-INR    APTT    Progesterone   3. Bleeding after intercourse  [address] [facility] NON OB TRANSVAGINAL    Vaginitis DNA Probe    C.trachomatis N.gonorrhoeae DNA        The patient had her preventative health maintenance recommendations and follow-up reviewed with her at the completion of her visit.

## 2022-04-25 DIAGNOSIS — N93.0 BLEEDING AFTER INTERCOURSE: ICD-10-CM

## 2022-04-26 ENCOUNTER — TELEPHONE (OUTPATIENT)
Dept: OBGYN CLINIC | Age: 22
End: 2022-04-26

## 2022-04-26 NOTE — TELEPHONE ENCOUNTER
----- Message from SHANE Rey - NP sent at 4/25/2022  4:52 PM EDT -----  + BV  Flagyl 500mg PO BID x 7 days

## 2022-04-26 NOTE — TELEPHONE ENCOUNTER
Attempted to contact patient in regards to results, VM box currently full. Will try call again later.

## 2022-04-27 NOTE — TELEPHONE ENCOUNTER
----- Message from SHANE Malloy - NP sent at 4/25/2022  4:52 PM EDT -----  + BV  Flagyl 500mg PO BID x 7 days

## 2022-05-03 RX ORDER — METRONIDAZOLE 500 MG/1
500 TABLET ORAL 2 TIMES DAILY
Qty: 14 TABLET | Refills: 0 | Status: SHIPPED | OUTPATIENT
Start: 2022-05-03 | End: 2022-05-10

## 2022-06-09 ENCOUNTER — OFFICE VISIT (OUTPATIENT)
Dept: FAMILY MEDICINE CLINIC | Age: 22
End: 2022-06-09
Payer: COMMERCIAL

## 2022-06-09 ENCOUNTER — HOSPITAL ENCOUNTER (OUTPATIENT)
Age: 22
Setting detail: SPECIMEN
Discharge: HOME | End: 2022-06-09

## 2022-06-09 VITALS
WEIGHT: 154 LBS | BODY MASS INDEX: 29.07 KG/M2 | RESPIRATION RATE: 20 BRPM | DIASTOLIC BLOOD PRESSURE: 78 MMHG | HEIGHT: 61 IN | SYSTOLIC BLOOD PRESSURE: 112 MMHG

## 2022-06-09 DIAGNOSIS — R35.0 URINE FREQUENCY: ICD-10-CM

## 2022-06-09 DIAGNOSIS — N89.8 VAGINAL DISCHARGE: ICD-10-CM

## 2022-06-09 DIAGNOSIS — N89.8 VAGINAL LESION: ICD-10-CM

## 2022-06-09 DIAGNOSIS — R35.0 URINE FREQUENCY: Primary | ICD-10-CM

## 2022-06-09 LAB
BILIRUBIN, POC: NEGATIVE
BLOOD URINE, POC: NEGATIVE
CANDIDA SPECIES, DNA PROBE: POSITIVE
CLARITY, POC: CLEAR
COLOR, POC: NORMAL
CONTROL: PRESENT
GARDNERELLA VAGINALIS, DNA PROBE: POSITIVE
GLUCOSE URINE, POC: NEGATIVE
KETONES, POC: NEGATIVE
LEUKOCYTE EST, POC: NEGATIVE
NITRITE, POC: NEGATIVE
PH, POC: 6
PREGNANCY TEST URINE, POC: NEGATIVE
PROTEIN, POC: NEGATIVE
SOURCE: ABNORMAL
SPECIFIC GRAVITY, POC: 1
TRICHOMONAS VAGINALIS DNA: NEGATIVE
UROBILINOGEN, POC: 0.2

## 2022-06-09 PROCEDURE — 81025 URINE PREGNANCY TEST: CPT | Performed by: NURSE PRACTITIONER

## 2022-06-09 PROCEDURE — 81002 URINALYSIS NONAUTO W/O SCOPE: CPT | Performed by: NURSE PRACTITIONER

## 2022-06-09 PROCEDURE — 99213 OFFICE O/P EST LOW 20 MIN: CPT | Performed by: NURSE PRACTITIONER

## 2022-06-09 SDOH — ECONOMIC STABILITY: FOOD INSECURITY: WITHIN THE PAST 12 MONTHS, YOU WORRIED THAT YOUR FOOD WOULD RUN OUT BEFORE YOU GOT MONEY TO BUY MORE.: NEVER TRUE

## 2022-06-09 SDOH — ECONOMIC STABILITY: FOOD INSECURITY: WITHIN THE PAST 12 MONTHS, THE FOOD YOU BOUGHT JUST DIDN'T LAST AND YOU DIDN'T HAVE MONEY TO GET MORE.: NEVER TRUE

## 2022-06-09 ASSESSMENT — SOCIAL DETERMINANTS OF HEALTH (SDOH): HOW HARD IS IT FOR YOU TO PAY FOR THE VERY BASICS LIKE FOOD, HOUSING, MEDICAL CARE, AND HEATING?: NOT HARD AT ALL

## 2022-06-09 ASSESSMENT — PATIENT HEALTH QUESTIONNAIRE - PHQ9
SUM OF ALL RESPONSES TO PHQ QUESTIONS 1-9: 0
SUM OF ALL RESPONSES TO PHQ9 QUESTIONS 1 & 2: 0
1. LITTLE INTEREST OR PLEASURE IN DOING THINGS: 0
SUM OF ALL RESPONSES TO PHQ QUESTIONS 1-9: 0
2. FEELING DOWN, DEPRESSED OR HOPELESS: 0

## 2022-06-09 ASSESSMENT — ENCOUNTER SYMPTOMS
BACK PAIN: 0
SINUS PRESSURE: 0
CONSTIPATION: 0
SINUS PAIN: 0
ABDOMINAL PAIN: 0
VOMITING: 0
EYE PAIN: 0
CHEST TIGHTNESS: 0
NAUSEA: 0
DIARRHEA: 0
COLOR CHANGE: 0
SHORTNESS OF BREATH: 0

## 2022-06-09 NOTE — PROGRESS NOTES
Marylee Brought (:  2000) is a 19 y.o. female,Established patient, here for evaluation of the following chief complaint(s):  Urinary Tract Infection (states uncomfortable, urine frequency started yesterday)         ASSESSMENT/PLAN:  1. Urine frequency  Comments: Follow up pending results. Orders:  -     POCT Urinalysis no Micro  -     POCT urine pregnancy  -     Culture, Urine; Future  2. Vaginal discharge  -     Vaginitis DNA Probe; Future  -     Chlamydia Trachomatis & Neisseria gonorrhoeae (GC) by amplified detection; Future  3. Vaginal lesion  Comments:  Likely as a result of shaving, avoid using scented soaps/detergents. Ok to use OTC antibiotic ointment twice a day for 5 days. Return if symptoms worsen or fail to improve. Subjective   SUBJECTIVE/OBJECTIVE:  Presents with boyfriend for acute visit to discuss urinary symptoms  Boyfriend concerned that if she does have a UTI it would be transmissible     Yesterday she went pee, after she finished and stood up and it felt like she had to pee again  Since then has had lower abdominal pressure, 'like something is not right'  Denies fever, chills, flank pain, lower back pain, nausea     Noticed a small cut one day ago to outside of vagina  Admits to shaving the day prior, unsure if she cut herself or not   Burns when she touches it - has not noticed any purulent drainage in underwear    Denies any other problems/concerns at this time         Review of Systems   Constitutional: Negative for activity change, chills, fatigue and unexpected weight change. HENT: Negative for hearing loss, postnasal drip, sinus pressure and sinus pain. Eyes: Negative for pain and visual disturbance. Respiratory: Negative for chest tightness and shortness of breath. Cardiovascular: Negative for chest pain and palpitations. Gastrointestinal: Negative for abdominal pain, constipation, diarrhea, nausea and vomiting.         Lower abdominal discomfort 154 lb (69.9 kg)   10/21/21 156 lb 6.4 oz (70.9 kg)     Temp Readings from Last 3 Encounters:   02/14/22 97.9 °F (36.6 °C) (Infrared)   01/27/21 97.2 °F (36.2 °C)   02/15/19 98.7 °F (37.1 °C) (Oral)     BP Readings from Last 3 Encounters:   06/09/22 112/78   04/19/22 114/70   02/14/22 (!) 113/59     Pulse Readings from Last 3 Encounters:   02/14/22 77   10/21/21 89   01/27/21 83         An electronic signature was used to authenticate this note.     --SHANE Ramos - NP

## 2022-06-09 NOTE — PATIENT INSTRUCTIONS
New Updates for [facility] MyChart/ [employer] ([facility]) GARETH    Thank you for choosing US to give you the best care! [employer] ([facility]) is always trying to think of new ways to help their patients. We are asking all patients to try out the new digital registration that is now available through your [facility] account or the new GARETH, [employer] ([facility]). Via the gareth you're now able to update your personal and registration information prior to your upcoming appointment. This will save you time once you arrive at the office to check-in, not to mention your information remains safe!! Many other perks come from signing up for an account, such as:   Requesting refills   Scheduling an appointment   Completing an [address] Sending a message to the office/provider   Having access to your medication list   Paying your bill/copay prior to your appointment   Scheduling your yearly mammogram   Review your test results    If you are not familiar with [facility] or the [employer] ([facility]) GARETH, please ask one of us and we will be happy to answer any questions or help you set-up your account.       Your [facility] office,  [facility]

## 2022-06-10 DIAGNOSIS — B37.31 VAGINAL CANDIDIASIS: ICD-10-CM

## 2022-06-10 DIAGNOSIS — N76.0 BV (BACTERIAL VAGINOSIS): Primary | ICD-10-CM

## 2022-06-10 DIAGNOSIS — B96.89 BV (BACTERIAL VAGINOSIS): Primary | ICD-10-CM

## 2022-06-10 LAB
C TRACH DNA GENITAL QL NAA+PROBE: NEGATIVE
N. GONORRHOEAE DNA: NEGATIVE
SPECIMEN DESCRIPTION: NORMAL

## 2022-06-10 RX ORDER — FLUCONAZOLE 150 MG/1
150 TABLET ORAL ONCE
Qty: 1 TABLET | Refills: 1 | Status: SHIPPED | OUTPATIENT
Start: 2022-06-10 | End: 2022-06-10

## 2022-06-10 RX ORDER — METRONIDAZOLE 500 MG/1
500 TABLET ORAL 2 TIMES DAILY
Qty: 14 TABLET | Refills: 0 | Status: SHIPPED | OUTPATIENT
Start: 2022-06-10 | End: 2022-06-17

## 2022-06-11 LAB
CULTURE: ABNORMAL
SPECIMEN DESCRIPTION: ABNORMAL

## 2022-06-12 DIAGNOSIS — R35.0 URINE FREQUENCY: Primary | ICD-10-CM

## 2022-06-12 RX ORDER — NITROFURANTOIN 25; 75 MG/1; MG/1
100 CAPSULE ORAL 2 TIMES DAILY
Qty: 14 CAPSULE | Refills: 0 | Status: SHIPPED | OUTPATIENT
Start: 2022-06-12 | End: 2022-06-19

## 2022-07-19 ENCOUNTER — TELEPHONE (OUTPATIENT)
Dept: FAMILY MEDICINE CLINIC | Age: 22
End: 2022-07-19

## 2022-07-19 DIAGNOSIS — F32.A DEPRESSION, UNSPECIFIED DEPRESSION TYPE: Primary | ICD-10-CM

## 2022-07-19 DIAGNOSIS — F41.9 ANXIETY: ICD-10-CM

## 2022-07-19 NOTE — TELEPHONE ENCOUNTER
----- Message from Farrukh Gaines sent at 7/19/2022  3:22 PM EDT -----  Subject: Referral Request    Reason for referral request? pt is requesting a referral to psychology   please advise and call pt  Provider patient wants to be referred to(if known):     Provider Phone Number(if known):     Additional Information for Provider?   ---------------------------------------------------------------------------  --------------  [address] [facility]    [phone]; OK to leave message on voicemail  ---------------------------------------------------------------------------  --------------

## 2022-07-19 NOTE — TELEPHONE ENCOUNTER
Pt was given name and number of who referral was done for. She was instructed to call for appt. She was advised if the office doesn't take insurance to call insurance to see who is in network.   She states her emotions all over the place

## 2022-07-19 NOTE — TELEPHONE ENCOUNTER
I left a message for a call back to confirm reason for referral.  It list in previous encounter HX of anxiety and depression

## 2022-08-04 ENCOUNTER — TELEMEDICINE (OUTPATIENT)
Dept: FAMILY MEDICINE CLINIC | Age: 22
End: 2022-08-04

## 2022-08-04 DIAGNOSIS — N30.00 ACUTE CYSTITIS WITHOUT HEMATURIA: Primary | ICD-10-CM

## 2022-08-04 RX ORDER — SULFAMETHOXAZOLE AND TRIMETHOPRIM 800; 160 MG/1; MG/1
1 TABLET ORAL 2 TIMES DAILY
Qty: 10 TABLET | Refills: 0 | Status: SHIPPED | OUTPATIENT
Start: 2022-08-04 | End: 2022-08-09

## 2022-08-04 ASSESSMENT — ENCOUNTER SYMPTOMS
SHORTNESS OF BREATH: 0
COUGH: 0

## 2022-08-04 NOTE — PROGRESS NOTES
Patient is present complaining of possible uti x2 days  Patient states she was having some discomfort at first, urinary frequency, dysuria
[x] Normal  [] Abnormal -    Neck: [x] No visualized mass [] Abnormal -     Pulmonary/Chest: [x] Respiratory effort normal   [x] No visualized signs of difficulty breathing or respiratory distress        [] Abnormal -      Musculoskeletal:   [x] Normal gait with no signs of ataxia         [x] Normal range of motion of neck        [] Abnormal -     Neurological:        [x] No Facial Asymmetry (Cranial nerve 7 motor function) (limited exam due to video visit)          [x] No gaze palsy        [] Abnormal -          Skin:        [x] No significant exanthematous lesions or discoloration noted on facial skin         [] Abnormal -            Psychiatric:       [x] Normal Affect [] Abnormal -        [x] No Hallucinations    Other pertinent observable physical exam findings:-             Rebecca Miller, was evaluated through a synchronous (real-time) audio-video encounter. The patient (or guardian if applicable) is aware that this is a billable service, which includes applicable co-pays. This Virtual Visit was conducted with patient's (and/or legal guardian's) consent. The visit was conducted pursuant to the emergency declaration under the [address] [place] [address] authority and the [employer] Act. Patient identification was verified, and a caregiver was present when appropriate. The patient was located at Home: [address] [place] [address].    Provider was located at [facility]): SHANE Briscoe - ERASMO

## 2022-08-30 ENCOUNTER — OFFICE VISIT (OUTPATIENT)
Dept: OBGYN CLINIC | Age: 22
End: 2022-08-30
Payer: COMMERCIAL

## 2022-08-30 DIAGNOSIS — N93.0 BLEEDING AFTER INTERCOURSE: ICD-10-CM

## 2022-08-30 DIAGNOSIS — J45.20 MILD INTERMITTENT ASTHMA WITHOUT COMPLICATION: ICD-10-CM

## 2022-08-30 DIAGNOSIS — N92.6 IRREGULAR MENSES: ICD-10-CM

## 2022-08-30 PROCEDURE — 76830 TRANSVAGINAL US NON-OB: CPT | Performed by: OBSTETRICS & GYNECOLOGY

## 2022-08-30 RX ORDER — ALBUTEROL SULFATE 90 UG/1
AEROSOL, METERED RESPIRATORY (INHALATION)
Qty: 6.7 G | Refills: 2 | Status: SHIPPED | OUTPATIENT
Start: 2022-08-30

## 2022-08-30 NOTE — PROGRESS NOTES
Last visit: 08/04/2022 via VV  Last Med refill: 03/22/2022  Does patient have enough medication for 72 hours: No:     Pt is out    Next Visit Date:  No future appointments.     Health Maintenance   Topic Date Due    COVID-19 Vaccine (1) Never done    Varicella vaccine (1 of 2 - 2-dose childhood series) Never done    Pneumococcal 0-64 years Vaccine (1 - PCV) Never done    HPV vaccine (1 - 2-dose series) Never done    HIV screen  Never done    Hepatitis C screen  Never done    DTaP/Tdap/Td vaccine (1 - Tdap) Never done    Pap smear  Never done    Flu vaccine (1) 09/01/2022    Depression Screen  06/09/2023    Chlamydia screen  06/09/2023    Hepatitis A vaccine  Aged Out    Hepatitis B vaccine  Aged Out    Hib vaccine  Aged Out    Meningococcal (ACWY) vaccine  Aged Out       No results found for: LABA1C          ( goal A1C is < 7)   No results found for: LABMICR  No results found for: LDLCHOLESTEROL, LDLCALC    (goal LDL is <100)   No results found for: AST, ALT, BUN, CR  BP Readings from Last 3 Encounters:   06/09/22 112/78   04/19/22 114/70   02/14/22 (!) 113/59          (goal 120/80)    All Future Testing planned in [facility]  Lab Frequency Next Occurrence   HCG Qualitative, Serum Once 37/41/5325   Follicle Stimulating Hormone Once 02/14/2022   Estradiol Once 02/14/2022   Prolactin Once 02/14/2022   TSH Once 02/14/2022   PAP SMEAR Once 60/75/3706   Follicle Stimulating Hormone Once 05/03/2022   Luteinizing Hormone Once 05/03/2022   Glucose, Fasting Once 04/19/2022   Hemoglobin A1C Once 05/03/2022   BUN & Creatinine Once 04/19/2022   AST Once 04/19/2022   ALT Once 04/19/2022   Estradiol Once 05/03/2022   CBC with Auto Differential Once 05/03/2022   TSH with Reflex Once 04/19/2022   HCG, Quantitative, Pregnancy Once 04/19/2022   Protime-INR Once 04/19/2022   APTT Once 04/19/2022   Progesterone Once 04/19/2022               Patient Active Problem List:     Breast pain in female     Breast lump in female

## 2022-08-31 ENCOUNTER — TELEPHONE (OUTPATIENT)
Dept: OBGYN CLINIC | Age: 22
End: 2022-08-31

## 2022-08-31 NOTE — TELEPHONE ENCOUNTER
----- Message from SHANE Mujica CNP sent at 8/30/2022  4:20 PM EDT -----  Normal pelvic ultrasound  If patient still having irregular menses- have patient complete previously ordered bloodwork and schedule follow up with physician

## 2022-09-01 NOTE — TELEPHONE ENCOUNTER
Patient notified of results, patient states she is not currently having bleeding issues, patient encouraged to complete labs previously ordered. Patient voiced understanding.

## 2022-10-26 ENCOUNTER — TELEPHONE (OUTPATIENT)
Dept: FAMILY MEDICINE CLINIC | Age: 22
End: 2022-10-26

## 2022-10-26 NOTE — TELEPHONE ENCOUNTER
----- Message from Emy Heard sent at 10/26/2022  8:36 AM EDT -----  Subject: Appointment Request    Reason for Call: Established Patient Appointment needed: Urgent Cough Cold    QUESTIONS    Reason for appointment request? No appointments available during search     Additional Information for Provider? Patient is experiencing cold/cough   for over a week now. Would like to get appointment to see provider by   appointment or even virtual she stated.  patient did not want to do a walk   in clinic  ---------------------------------------------------------------------------  --------------  [id number] Patient Conversation Media  [phone]; OK to leave message on voicemail  ---------------------------------------------------------------------------  --------------  SCRIPT ANSWERS  COVID Screen: Red

## 2022-11-03 ENCOUNTER — OFFICE VISIT (OUTPATIENT)
Dept: FAMILY MEDICINE CLINIC | Age: 22
End: 2022-11-03
Payer: COMMERCIAL

## 2022-11-03 VITALS
WEIGHT: 156 LBS | SYSTOLIC BLOOD PRESSURE: 114 MMHG | DIASTOLIC BLOOD PRESSURE: 76 MMHG | OXYGEN SATURATION: 98 % | BODY MASS INDEX: 29.45 KG/M2 | RESPIRATION RATE: 22 BRPM | HEIGHT: 61 IN | HEART RATE: 89 BPM

## 2022-11-03 DIAGNOSIS — G56.03 BILATERAL CARPAL TUNNEL SYNDROME: ICD-10-CM

## 2022-11-03 DIAGNOSIS — J06.9 VIRAL URI: Primary | ICD-10-CM

## 2022-11-03 DIAGNOSIS — J45.20 MILD INTERMITTENT ASTHMA WITHOUT COMPLICATION: ICD-10-CM

## 2022-11-03 DIAGNOSIS — F17.210 CIGARETTE NICOTINE DEPENDENCE WITHOUT COMPLICATION: ICD-10-CM

## 2022-11-03 PROCEDURE — 99214 OFFICE O/P EST MOD 30 MIN: CPT | Performed by: NURSE PRACTITIONER

## 2022-11-03 RX ORDER — ALBUTEROL SULFATE 2.5 MG/3ML
2.5 SOLUTION RESPIRATORY (INHALATION) EVERY 6 HOURS PRN
Qty: 120 EACH | Refills: 3 | Status: SHIPPED | OUTPATIENT
Start: 2022-11-03

## 2022-11-03 NOTE — PROGRESS NOTES
Allen Rea (:  2000) is a 23 y.o. female,Established patient, here for evaluation of the following chief complaint(s):  Cough (Productive cough with some wheezing for about a week. Pt states she is feeling better. /), Congestion (X1 week, feeling better. /), and Hand Pain (Bilateral hand pain. Pt states \"am I to young for arthritis? \")         ASSESSMENT/PLAN:  1. Viral URI  Comments:  Resolved. 2. Mild intermittent asthma without complication  -     albuterol (PROVENTIL) (2.5 MG/3ML) 0.083% nebulizer solution; Take 3 mLs by nebulization every 6 hours as needed for Wheezing, Disp-120 each, R-3Normal  3. Cigarette nicotine dependence without complication  Comments:  Discussed hypnotherapy, accupuncture, support groups for smoking cessation. 4. Bilateral carpal tunnel syndrome  -     Misc. Devices MISC; ONCE PRN Starting Thu 11/3/2022, Until Thu 11/3/2022 at 2359, For 1 dose, Disp-2 each, R-0, PrintBilateral wrist braces - wear 12 hours at night when sleeping, can wear during the day as needed  Exercises provided in AVS. She left office without checking out or picking up AVS or Rx for wrist splints.        Return in about 6 months (around 5/3/2023) for physical.         Subjective   SUBJECTIVE/OBJECTIVE:  Presents for acute visit    Had URI one week ago  Symptoms have improved quite a bit   Has albuterol inhaler at home uses when she gets sick  Has moms nebulizer machine, needs albuterol nebulizing solution for this     Worried she has arthritis in her hands  Unable to even write a letter without having pain/numbness and tingling in right hand   Both hands started to 'really fall asleep a few weeks ago'   Does not wake up with symptoms at night   Is a  at work - does a lot of repetitive movements at work   Has not taken anything for pain, does not like to take pills     Wants to stop smoking  Used nicotine gum, made her feel sick   Has also tried stopping cold turkey  Does not want any medication, looking for natural route       Review of Systems   Constitutional:  Negative for activity change and fatigue. HENT:  Negative for congestion, ear discharge, ear pain, postnasal drip, rhinorrhea, sinus pressure, sinus pain and sore throat. Respiratory:  Negative for cough, shortness of breath and wheezing. Musculoskeletal:  Positive for arthralgias. Neurological:  Positive for numbness. Objective   Physical Exam  Vitals and nursing note reviewed. Constitutional:       General: She is not in acute distress. Appearance: Normal appearance. She is not ill-appearing. HENT:      Head: Normocephalic. Right Ear: Hearing, tympanic membrane, ear canal and external ear normal.      Left Ear: Hearing, tympanic membrane, ear canal and external ear normal.      Nose: Nose normal.      Mouth/Throat:      Mouth: Mucous membranes are moist.      Pharynx: Oropharynx is clear. Uvula midline. No pharyngeal swelling or posterior oropharyngeal erythema. Eyes:      Extraocular Movements: Extraocular movements intact. Conjunctiva/sclera: Conjunctivae normal.      Pupils: Pupils are equal, round, and reactive to light. Cardiovascular:      Rate and Rhythm: Normal rate and regular rhythm. Pulses: Normal pulses. Heart sounds: Normal heart sounds. Pulmonary:      Effort: Pulmonary effort is normal.      Breath sounds: Normal breath sounds and air entry. No decreased breath sounds, wheezing, rhonchi or rales. Abdominal:      General: Abdomen is flat. Bowel sounds are normal.      Palpations: Abdomen is soft. Musculoskeletal:         General: Normal range of motion. Right wrist: No tenderness. Normal range of motion. Left wrist: No tenderness. Normal range of motion. Cervical back: Normal range of motion. Comments: Positive phalen test in right hand, reports tingling sensation radiating down 4th and 5th digits. Lymphadenopathy:      Cervical: No cervical adenopathy. Skin:     General: Skin is warm and dry. Capillary Refill: Capillary refill takes less than 2 seconds. Neurological:      General: No focal deficit present. Mental Status: She is alert and oriented to person, place, and time. Mental status is at baseline. Psychiatric:         Mood and Affect: Mood normal.         Behavior: Behavior normal.         Thought Content: Thought content normal.         Judgment: Judgment normal.          Wt Readings from Last 3 Encounters:   11/03/22 156 lb (70.8 kg)   06/09/22 154 lb (69.9 kg)   04/19/22 154 lb (69.9 kg)     Temp Readings from Last 3 Encounters:   02/14/22 97.9 °F (36.6 °C) (Infrared)   01/27/21 97.2 °F (36.2 °C)   02/15/19 98.7 °F (37.1 °C) (Oral)     BP Readings from Last 3 Encounters:   11/03/22 114/76   06/09/22 112/78   04/19/22 114/70     Pulse Readings from Last 3 Encounters:   11/03/22 89   02/14/22 77   10/21/21 89           An electronic signature was used to authenticate this note.     --SHANE Murphy NP

## 2022-11-05 ASSESSMENT — ENCOUNTER SYMPTOMS
RHINORRHEA: 0
SHORTNESS OF BREATH: 0
SINUS PAIN: 0
SORE THROAT: 0
COUGH: 0
SINUS PRESSURE: 0
WHEEZING: 0

## 2023-05-12 ENCOUNTER — OFFICE VISIT (OUTPATIENT)
Dept: FAMILY MEDICINE CLINIC | Age: 23
End: 2023-05-12
Payer: COMMERCIAL

## 2023-05-12 VITALS
HEIGHT: 61 IN | SYSTOLIC BLOOD PRESSURE: 116 MMHG | WEIGHT: 160.6 LBS | OXYGEN SATURATION: 96 % | DIASTOLIC BLOOD PRESSURE: 76 MMHG | BODY MASS INDEX: 30.32 KG/M2 | HEART RATE: 81 BPM | RESPIRATION RATE: 20 BRPM

## 2023-05-12 DIAGNOSIS — H93.8X2 MASS OF LEFT EAR: Primary | ICD-10-CM

## 2023-05-12 PROCEDURE — 99213 OFFICE O/P EST LOW 20 MIN: CPT | Performed by: NURSE PRACTITIONER

## 2023-05-12 RX ORDER — DOXYCYCLINE HYCLATE 100 MG
100 TABLET ORAL 2 TIMES DAILY
Qty: 14 TABLET | Refills: 0 | Status: SHIPPED | OUTPATIENT
Start: 2023-05-12 | End: 2023-05-19

## 2023-05-12 SDOH — ECONOMIC STABILITY: FOOD INSECURITY: WITHIN THE PAST 12 MONTHS, YOU WORRIED THAT YOUR FOOD WOULD RUN OUT BEFORE YOU GOT MONEY TO BUY MORE.: NEVER TRUE

## 2023-05-12 SDOH — ECONOMIC STABILITY: INCOME INSECURITY: HOW HARD IS IT FOR YOU TO PAY FOR THE VERY BASICS LIKE FOOD, HOUSING, MEDICAL CARE, AND HEATING?: NOT HARD AT ALL

## 2023-05-12 SDOH — ECONOMIC STABILITY: HOUSING INSECURITY
IN THE LAST 12 MONTHS, WAS THERE A TIME WHEN YOU DID NOT HAVE A STEADY PLACE TO SLEEP OR SLEPT IN A SHELTER (INCLUDING NOW)?: NO

## 2023-05-12 SDOH — ECONOMIC STABILITY: FOOD INSECURITY: WITHIN THE PAST 12 MONTHS, THE FOOD YOU BOUGHT JUST DIDN'T LAST AND YOU DIDN'T HAVE MONEY TO GET MORE.: NEVER TRUE

## 2023-05-12 ASSESSMENT — PATIENT HEALTH QUESTIONNAIRE - PHQ9
SUM OF ALL RESPONSES TO PHQ QUESTIONS 1-9: 0
SUM OF ALL RESPONSES TO PHQ9 QUESTIONS 1 & 2: 0
2. FEELING DOWN, DEPRESSED OR HOPELESS: 0
SUM OF ALL RESPONSES TO PHQ QUESTIONS 1-9: 0
1. LITTLE INTEREST OR PLEASURE IN DOING THINGS: 0

## 2023-05-12 ASSESSMENT — ENCOUNTER SYMPTOMS: COLOR CHANGE: 0

## 2023-05-12 NOTE — PROGRESS NOTES
Vickie Hunter (:  2000) is a 23 y.o. female,Established patient, here for evaluation of the following chief complaint(s):  Facial Swelling (Left ear swelling. /States she has a lump on the back of her ear.) and Health Maintenance (Depression screen completed. /SDOH completed. /)         ASSESSMENT/PLAN:  1. Mass of left ear  -     AFL - Brijesh Sebastian MD, Dermatology, [facility]  -     doxycycline hyclate (VIBRA-TABS) 100 MG tablet; Take 1 tablet by mouth 2 times daily for 7 days, Disp-14 tablet, R-0Normal    Rx for doxycycline with instruction for use  Follow up with derm for reoccurring lesions - referral placed     Return if symptoms worsen or fail to improve. Subjective   SUBJECTIVE/OBJECTIVE:  Presents for acute visit     Developed mass to posterior left ear 2 days ago  It is a bit tender to touch, mostly feels like pressure   Has been getting these ever since she has gotten her ears pierced. She typically is able to squeeze them and get white sticky, foul smelling discharge out of it. However this is the biggest it has ever been. Denies ear discharge, tinnitus. Review of Systems   Constitutional:  Negative for chills and fever. Skin:  Negative for color change, rash and wound. Mass behind left ear          Objective   Physical Exam  Vitals and nursing note reviewed. Constitutional:       Appearance: Normal appearance. HENT:      Head: Normocephalic. Right Ear: Hearing normal.      Left Ear: Hearing normal.      Nose: Nose normal.      Mouth/Throat:      Mouth: Mucous membranes are moist.      Pharynx: Oropharynx is clear. Eyes:      Extraocular Movements: Extraocular movements intact. Conjunctiva/sclera: Conjunctivae normal.      Pupils: Pupils are equal, round, and reactive to light. Cardiovascular:      Rate and Rhythm: Normal rate and regular rhythm. Pulses: Normal pulses. Heart sounds: Normal heart sounds.    Pulmonary:      Effort: Pulmonary effort

## 2023-12-06 DIAGNOSIS — J45.20 MILD INTERMITTENT ASTHMA WITHOUT COMPLICATION: ICD-10-CM

## 2023-12-06 NOTE — TELEPHONE ENCOUNTER
Last visit: 05/12/2023  Last Med refill: 08/15/2023  Does patient have enough medication for 72 hours: No:     Next Visit Date:  No future appointments.    Health Maintenance   Topic Date Due    Hepatitis B vaccine (1 of 3 - 3-dose series) Never done    COVID-19 Vaccine (1) Never done    Varicella vaccine (1 of 2 - 2-dose childhood series) Never done    Pneumococcal 0-64 years Vaccine (1 - PCV) Never done    HPV vaccine (1 - 2-dose series) Never done    HIV screen  Never done    Hepatitis C screen  Never done    DTaP/Tdap/Td vaccine (1 - Tdap) Never done    Pap smear  Never done    Flu vaccine (1) Never done    Depression Screen  05/12/2024    Hepatitis A vaccine  Aged Out    Hib vaccine  Aged Out    Meningococcal (ACWY) vaccine  Aged Out    Chlamydia/GC screen  Discontinued       No results found for: \"LABA1C\"          ( goal A1C is < 7)   No components found for: \"LABMICR\"  No results found for: \"LDLCHOLESTEROL\", \"LDLCALC\"    (goal LDL is <100)   No results found for: \"AST\", \"ALT\", \"BUN\", \"CR\"  BP Readings from Last 3 Encounters:   05/12/23 116/76   11/03/22 114/76   06/09/22 112/78          (goal 120/80)    All Future Testing planned in [facility]  Lab Frequency Next Occurrence               Patient Active Problem List:     Breast pain in female     Breast lump in female

## 2023-12-07 RX ORDER — ALBUTEROL SULFATE 90 UG/1
AEROSOL, METERED RESPIRATORY (INHALATION)
Qty: 18 G | Refills: 2 | Status: SHIPPED | OUTPATIENT
Start: 2023-12-07 | End: 2024-02-08 | Stop reason: SDUPTHER

## 2024-01-29 ENCOUNTER — HOSPITAL ENCOUNTER (OUTPATIENT)
Age: 24
Setting detail: SPECIMEN
Discharge: HOME | End: 2024-01-29

## 2024-01-29 ENCOUNTER — OFFICE VISIT (OUTPATIENT)
Dept: FAMILY MEDICINE CLINIC | Age: 24
End: 2024-01-29
Payer: COMMERCIAL

## 2024-01-29 VITALS
BODY MASS INDEX: 31.04 KG/M2 | RESPIRATION RATE: 18 BRPM | DIASTOLIC BLOOD PRESSURE: 76 MMHG | HEIGHT: 61 IN | OXYGEN SATURATION: 95 % | SYSTOLIC BLOOD PRESSURE: 114 MMHG | HEART RATE: 75 BPM | WEIGHT: 164.4 LBS

## 2024-01-29 DIAGNOSIS — R35.0 URINE FREQUENCY: Primary | ICD-10-CM

## 2024-01-29 LAB
BILIRUBIN, POC: ABNORMAL
BLOOD URINE, POC: ABNORMAL
CLARITY, POC: ABNORMAL
COLOR, POC: ABNORMAL
GLUCOSE URINE, POC: NEGATIVE
KETONES, POC: NEGATIVE
LEUKOCYTE EST, POC: ABNORMAL
NITRITE, POC: NEGATIVE
PH, POC: 5.5
PROTEIN, POC: 100
SPECIFIC GRAVITY, POC: 1.02
UROBILINOGEN, POC: 0.2

## 2024-01-29 PROCEDURE — 81002 URINALYSIS NONAUTO W/O SCOPE: CPT | Performed by: NURSE PRACTITIONER

## 2024-01-29 PROCEDURE — 99214 OFFICE O/P EST MOD 30 MIN: CPT | Performed by: NURSE PRACTITIONER

## 2024-01-29 RX ORDER — NITROFURANTOIN 25; 75 MG/1; MG/1
100 CAPSULE ORAL 2 TIMES DAILY
Qty: 14 CAPSULE | Refills: 0 | Status: SHIPPED | OUTPATIENT
Start: 2024-01-29 | End: 2024-02-05

## 2024-01-29 ASSESSMENT — PATIENT HEALTH QUESTIONNAIRE - PHQ9
SUM OF ALL RESPONSES TO PHQ9 QUESTIONS 1 & 2: 0
2. FEELING DOWN, DEPRESSED OR HOPELESS: 0
SUM OF ALL RESPONSES TO PHQ QUESTIONS 1-9: 0
1. LITTLE INTEREST OR PLEASURE IN DOING THINGS: 0

## 2024-01-29 NOTE — PROGRESS NOTES
Yvonne Ching (:  2000) is a 23 y.o. female,Established patient, here for evaluation of the following chief complaint(s):  Urinary Tract Infection (X2 days. /No pain or burning with urination. Pt states she is having frequency. /) and Health Maintenance (Depression screen completed. /)         ASSESSMENT/PLAN:  1. Urine frequency  -     POCT Urinalysis no Micro  -     Culture, Urine; Future  -     nitrofurantoin, macrocrystal-monohydrate, (MACROBID) 100 MG capsule; Take 1 capsule by mouth 2 times daily for 7 days, Disp-14 capsule, R-0Normal    POCT UA + for large  blood, small leukocytes, ph 5.5. Send urine out for culture - reviewed we may need to adjust antibiotics based on C&S report. Verbalized understanding.   Will treat for UTI.     Take the entire course of antibiotics as prescribed, even if you begin to feel much better in 1-2 days   Drink plenty of clear, non-caffeinated fluids.   Take NSAIDs with food only if needed for pain for the next 3-5 days, then as needed for continued pain.    Education in AVS  Call office or go to the emergency department, if you have worsening pain, persistent vomiting with inability to keep down fluids or medications, fever greater than 101°F, increasing abdominal pain, or other concerning symptoms.      Return in about 3 months (around 2024), or if symptoms worsen or fail to improve, for physical.         Subjective   SUBJECTIVE/OBJECTIVE:  Presents for acute visit to discuss UTI symptoms    present for visit     Last UTI 2022, +e coli. Treated with macrobid  LMP ended 2024  Reports pelvic pressure, urinary frequency since 2024      Urinary Tract Infection  This is a new problem. The current episode started in the past 7 days. The problem has been gradually worsening since onset. Associated symptoms include pain. Pertinent negatives include no hematuria. The pain is present in the bladder. Her pain is at a severity of 4/10. Risk factors: PMH

## 2024-01-30 DIAGNOSIS — R35.0 URINE FREQUENCY: ICD-10-CM

## 2024-01-31 LAB
MICROORGANISM SPEC CULT: ABNORMAL
SPECIMEN DESCRIPTION: ABNORMAL

## 2024-02-02 DIAGNOSIS — J45.20 MILD INTERMITTENT ASTHMA WITHOUT COMPLICATION: ICD-10-CM

## 2024-02-05 NOTE — TELEPHONE ENCOUNTER
Last visit: 01/29/24  Last Med refill: 01/11/24  Does patient have enough medication for 72 hours: Yes    Next Visit Date:  Future Appointments   Date Time Provider Department Center   4/29/2024  4:15 PM Chad Perry, APRN - CNP [id number]       Health Maintenance   Topic Date Due    Hepatitis B vaccine (1 of 3 - 3-dose series) Never done    COVID-19 Vaccine (1) Never done    Varicella vaccine (1 of 2 - 2-dose childhood series) Never done    Pneumococcal 0-64 years Vaccine (1 - PCV) Never done    HPV vaccine (1 - 2-dose series) Never done    HIV screen  Never done    Hepatitis C screen  Never done    DTaP/Tdap/Td vaccine (1 - Tdap) Never done    Pap smear  Never done    Flu vaccine (1) Never done    Depression Screen  01/29/2025    Hepatitis A vaccine  Aged Out    Hib vaccine  Aged Out    Polio vaccine  Aged Out    Meningococcal (ACWY) vaccine  Aged Out    Chlamydia/GC screen  Discontinued       No results found for: \"LABA1C\"          ( goal A1C is < 7)   No components found for: \"LABMICR\"  No results found for: \"LDLCHOLESTEROL\", \"LDLCALC\"    (goal LDL is <100)   No results found for: \"AST\", \"ALT\", \"BUN\", \"CR\"  BP Readings from Last 3 Encounters:   01/29/24 114/76   05/12/23 116/76   11/03/22 114/76          (goal 120/80)    All Future Testing planned in [facility]  Lab Frequency Next Occurrence               Patient Active Problem List:     Breast pain in female     Breast lump in female

## 2024-02-08 ENCOUNTER — TELEMEDICINE (OUTPATIENT)
Dept: FAMILY MEDICINE CLINIC | Age: 24
End: 2024-02-08
Payer: COMMERCIAL

## 2024-02-08 DIAGNOSIS — J45.20 MILD INTERMITTENT ASTHMA WITHOUT COMPLICATION: ICD-10-CM

## 2024-02-08 PROCEDURE — 99213 OFFICE O/P EST LOW 20 MIN: CPT | Performed by: NURSE PRACTITIONER

## 2024-02-08 RX ORDER — ALBUTEROL SULFATE 90 UG/1
2 AEROSOL, METERED RESPIRATORY (INHALATION) EVERY 4 HOURS PRN
Qty: 18 G | Refills: 2 | Status: SHIPPED | OUTPATIENT
Start: 2024-02-08

## 2024-02-08 ASSESSMENT — ENCOUNTER SYMPTOMS
SHORTNESS OF BREATH: 0
COUGH: 0

## 2024-02-08 NOTE — PROGRESS NOTES
Patient is doing a VV to discuss use of albuterol inhaler  She has seasonal allergies and with weather changes is uses more at night  She also recently got a dog and it is shedding.

## 2024-02-08 NOTE — PROGRESS NOTES
Yvonne Ching, was evaluated through a synchronous (real-time) audio-video encounter. The patient (or guardian if applicable) is aware that this is a billable service, which includes applicable co-pays. This Virtual Visit was conducted with patient's (and/or legal guardian's) consent. Patient identification was verified, and a caregiver was present when appropriate.   The patient was located at Home: [address] [place] [id number]  Provider was located at Home (Appt Dept State): [place]      Yvonne Ching (:  2000) is a Established patient, presenting virtually for evaluation of the following:    Assessment & Plan   Below is the assessment and plan developed based on review of pertinent history, physical exam, labs, studies, and medications.  1. Mild intermittent asthma without complication  -     albuterol sulfate HFA (PROVENTIL;VENTOLIN;PROAIR) 108 (90 Base) MCG/ACT inhaler; Inhale 2 puffs into the lungs every 4 hours as needed for Wheezing, Disp-18 g, R-2Normal    No follow-ups on file.       Subjective   Asthma  There is no cough or shortness of breath. Pertinent negatives include no chest pain or fever. Her past medical history is significant for asthma.     Yvonne is here for asthma med refill.  States her dog shedding makes it feel like she needs her albuterol. Doesn't really use kassidy during the day. She just got an air purifier and this is helping.   Review of Systems   Constitutional:  Negative for chills and fever.   Respiratory:  Negative for cough and shortness of breath.    Cardiovascular:  Negative for chest pain, palpitations and leg swelling.          Objective   Patient-Reported Vitals  No data recorded     Physical Exam  [INSTRUCTIONS:  \"[x]\" Indicates a positive item  \"[]\" Indicates a negative item  -- DELETE ALL ITEMS NOT EXAMINED]    Constitutional: [x] Appears well-developed and well-nourished [x] No apparent distress      [] Abnormal -     Mental status: [x] Alert and awake  [x] Oriented to

## 2024-02-13 ENCOUNTER — HOSPITAL ENCOUNTER (EMERGENCY)
Age: 24
Discharge: HOME | End: 2024-02-13
Attending: EMERGENCY MEDICINE
Payer: COMMERCIAL

## 2024-02-13 VITALS
BODY MASS INDEX: 32.39 KG/M2 | HEART RATE: 78 BPM | TEMPERATURE: 98.2 F | OXYGEN SATURATION: 99 % | RESPIRATION RATE: 18 BRPM | WEIGHT: 165 LBS | SYSTOLIC BLOOD PRESSURE: 112 MMHG | HEIGHT: 60 IN | DIASTOLIC BLOOD PRESSURE: 65 MMHG

## 2024-02-13 DIAGNOSIS — L02.419 CELLULITIS AND ABSCESS OF LEG: Primary | ICD-10-CM

## 2024-02-13 DIAGNOSIS — L03.119 CELLULITIS AND ABSCESS OF LEG: Primary | ICD-10-CM

## 2024-02-13 PROCEDURE — 10060 I&D ABSCESS SIMPLE/SINGLE: CPT

## 2024-02-13 PROCEDURE — 6370000000 HC RX 637 (ALT 250 FOR IP)

## 2024-02-13 PROCEDURE — 99283 EMERGENCY DEPT VISIT LOW MDM: CPT

## 2024-02-13 RX ORDER — CEPHALEXIN 500 MG/1
500 CAPSULE ORAL 2 TIMES DAILY
Qty: 14 CAPSULE | Refills: 0 | Status: SHIPPED | OUTPATIENT
Start: 2024-02-13 | End: 2024-02-20

## 2024-02-13 RX ORDER — IBUPROFEN 600 MG/1
600 TABLET ORAL ONCE
Status: COMPLETED | OUTPATIENT
Start: 2024-02-13 | End: 2024-02-13

## 2024-02-13 RX ORDER — CEPHALEXIN 250 MG/1
500 CAPSULE ORAL ONCE
Status: COMPLETED | OUTPATIENT
Start: 2024-02-13 | End: 2024-02-13

## 2024-02-13 RX ORDER — LIDOCAINE HYDROCHLORIDE 10 MG/ML
20 INJECTION, SOLUTION INFILTRATION; PERINEURAL ONCE
Status: DISCONTINUED | OUTPATIENT
Start: 2024-02-13 | End: 2024-02-13 | Stop reason: HOSPADM

## 2024-02-13 RX ORDER — LIDOCAINE HYDROCHLORIDE 10 MG/ML
INJECTION, SOLUTION EPIDURAL; INFILTRATION; INTRACAUDAL; PERINEURAL
Status: DISCONTINUED
Start: 2024-02-13 | End: 2024-02-13 | Stop reason: HOSPADM

## 2024-02-13 RX ADMIN — IBUPROFEN 600 MG: 600 TABLET ORAL at 14:18

## 2024-02-13 RX ADMIN — CEPHALEXIN 500 MG: 250 CAPSULE ORAL at 14:18

## 2024-02-13 ASSESSMENT — PAIN SCALES - GENERAL: PAINLEVEL_OUTOF10: 10

## 2024-02-13 ASSESSMENT — LIFESTYLE VARIABLES
HOW MANY STANDARD DRINKS CONTAINING ALCOHOL DO YOU HAVE ON A TYPICAL DAY: 1 OR 2
HOW OFTEN DO YOU HAVE A DRINK CONTAINING ALCOHOL: MONTHLY OR LESS

## 2024-02-13 ASSESSMENT — PAIN - FUNCTIONAL ASSESSMENT: PAIN_FUNCTIONAL_ASSESSMENT: 0-10

## 2024-02-13 NOTE — ED PROVIDER NOTES
[facility] EMERGENCY DEPARTMENT  eMERGENCY dEPARTMENT eNCOUnter   Independent Attestation     Pt Name: Yvonne Ching  MRN: [id number]  Birthdate 2000  Date of evaluation: 2/13/24       Yvonne Ching is a 23 y.o. female who presents with No chief complaint on file.        Based on the medical record, the care appears appropriate. I was personally available for consultation in the Emergency Department.    Woo Green DO  Attending Emergency  Physician                Woo Green DO  02/13/24 [id number]

## 2024-02-13 NOTE — ED PROVIDER NOTES
[facility] Emergency Department    [address]  [facility] [address]  Phone: [phone]  Fax: [phone]  Emergency Department  Faculty Attestation    I performed a history and physical examination of the patient and discussed management with the mid level provider. I reviewed the mid level provider's note and agree with the documented findings and plan of care. Any areas of disagreement are noted on the chart. I was personally present for the key portions of any procedures. I have documented in the chart those procedures where I was not present during the key portions. I have reviewed the emergency nurses triage note. I agree with the chief complaint, past medical history, past surgical history, allergies, medications, social and family history as documented unless otherwise noted below. Documentation of the HPI, Physical Exam and Medical Decision Making performed by medical students or scribes is based on my personal performance of the HPI, PE and MDM. For Physician Assistant/ Nurse Practitioner cases/documentation I have personally evaluated this patient and have completed at least one if not all key elements of the E/M (history, physical exam, and MDM). Additional findings are as noted.      Primary Care Physician:  Chad Perry, APRN - CNP    CHIEF COMPLAINT     No chief complaint on file.      RECENT VITALS:   Temp: 98.2 °F (36.8 °C),  Pulse: 78, Respirations: 18, BP: 112/65    LABS:  Labs Reviewed - No data to display      PERTINENT ATTENDING PHYSICIAN COMMENTS:    The patient presents with abscess on her right thigh.  She has had a smaller one before but nothing as large.  It has been going on for 2 days.  It has not drained.  She denies fever.    On exam, patient has a 1 x 1.5 centimeter swelling on the right medial thigh.  There is no open wound.  There is no significant redness.  Incision and drainage was performed by the nurse practitioner.  Please refer to her  documentation.  Wound care instructions and antibiotics were provided.  The patient is discharged in good condition.     Melissa Pathak MD  02/13/24 [id number]

## 2024-02-13 NOTE — DISCHARGE INSTRUCTIONS
Keep area clean and dry, cover with clean gauze to protect from clothing as needed.    Take cephalexin twice daily for the next seven days.    Take your medication as indicated, if you are given an antibiotic then make sure you get the prescription filled and take the antibiotics until finished.  Drink plenty of water while taking the antibiotics.  Avoid drinking alcohol or drinks that have caffeine in it while taking antibiotics.       For pain use ibuprofen (Motrin / Advil) or acetaminophen (Tylenol), unless prescribed medications that have acetaminophen in it.  You can take over the counter acetaminophen tablets (1 - 2 tablets of the 500-mg strength every 6 hours) or ibuprofen tablets (2 tablets every 4 hours).    PLEASE RETURN TO THE EMERGENCY DEPARTMENT IMMEDIATELY for worsening symptoms, white drainage from the wound, redness or streaking, or if you develop any concerning symptoms such as: high fever not relieved by acetaminophen (Tylenol) and/or ibuprofen (Motrin / Advil), chills, shortness of breath, chest pain, feeling of your heart fluttering or racing, persistent nausea and/or vomiting, numbness, weakness or tingling in the arms or legs or change in color of the extremities, changes in mental status, persistent headache, blurry vision, unable to follow up with your physician, or other any other care or concern.

## 2024-02-13 NOTE — ED PROVIDER NOTES
prompt an immediate call or follow up with their primary physician or return to the emergency department.    I have reviewed the disposition diagnosis with the patient and or their family/guardian. I have answered their questions and given discharge instructions. They voiced understanding of these instructions and did not have any further questions or complaints.     This patient was seen by the attending physician and they agreed with the assessment and plan.    CONSULTS:  None    PROCEDURES:  Incision/Drainage    Date/Time: 2/13/2024 2:05 PM    Performed by: Avril Nobles APRN - CNP  Authorized by: Woo Green DO    Consent:     Consent obtained:  Verbal    Consent given by:  Patient    Risks, benefits, and alternatives were discussed: yes      Risks discussed:  Bleeding, incomplete drainage, pain and infection    Alternatives discussed:  No treatment  Universal protocol:     Procedure explained and questions answered to patient or proxy's satisfaction: yes      Relevant documents present and verified: yes      Required blood products, implants, devices, and special equipment available: yes      Site/side marked: yes      Immediately prior to procedure, a time out was called: yes      Patient identity confirmed:  Arm band and verbally with patient  Location:     Type:  Abscess    Location:  Lower extremity    Lower extremity location:  Leg    Leg location:  L upper leg  Pre-procedure details:     Skin preparation:  Povidone-iodine  Sedation:     Sedation type:  None  Anesthesia:     Anesthesia method:  Local infiltration    Local anesthetic:  Lidocaine 1% w/o epi  Procedure type:     Complexity:  Simple  Procedure details:     Ultrasound guidance: no      Needle aspiration: no      Incision types:  Stab incision    Incision depth:  Subcutaneous    Wound management:  Probed and deloculated    Drainage:  Purulent and bloody    Drainage amount:  Moderate    Wound treatment:  Wound left open    Packing  materials:  None  Post-procedure details:     Procedure completion:  Tolerated with difficulty        FINAL IMPRESSION      1. Cellulitis and abscess of leg          DISPOSITION / PLAN     CONDITION ON DISPOSITION:  Good / Stable for discharge.     PATIENT REFERRED TO:  Chad Perry APRN - CNP  [address] [facility] [place]  [facility] [address]  [phone]    Schedule an appointment as soon as possible for a visit       [facility] Emergency Department  [id number] [place] [address].  [facility] [address] [phone]  Go to   New or worsening symptoms      DISCHARGE MEDICATIONS:  New Prescriptions    CEPHALEXIN (KEFLEX) 500 MG CAPSULE    Take 1 capsule by mouth 2 times daily for 7 days       SHANE Cortes CNP   Emergency Medicine Nurse Practitioner    (Please note that portions of this note were completed with a voice recognition program.  Efforts were made to edit the dictations but occasionally words aremis-transcribed.)       Avril Nobles APRN - CNP  02/13/24 1403       Avril Nobles APRN - CNP  02/13/24 1400

## 2024-02-15 ENCOUNTER — TELEMEDICINE (OUTPATIENT)
Dept: FAMILY MEDICINE CLINIC | Age: 24
End: 2024-02-15
Payer: COMMERCIAL

## 2024-02-15 DIAGNOSIS — L03.115 CELLULITIS AND ABSCESS OF RIGHT LEG: ICD-10-CM

## 2024-02-15 DIAGNOSIS — N39.0 FREQUENT UTI: Primary | ICD-10-CM

## 2024-02-15 DIAGNOSIS — L02.415 CELLULITIS AND ABSCESS OF RIGHT LEG: ICD-10-CM

## 2024-02-15 PROCEDURE — 99214 OFFICE O/P EST MOD 30 MIN: CPT | Performed by: NURSE PRACTITIONER

## 2024-02-15 RX ORDER — ALBUTEROL SULFATE 90 UG/1
AEROSOL, METERED RESPIRATORY (INHALATION)
Qty: 18 G | Refills: 2 | Status: SHIPPED | OUTPATIENT
Start: 2024-02-15

## 2024-02-15 NOTE — PROGRESS NOTES
Yvonne Ching, was evaluated through a synchronous (real-time) audio-video encounter. The patient (or guardian if applicable) is aware that this is a billable service, which includes applicable co-pays. This Virtual Visit was conducted with patient's (and/or legal guardian's) consent. Patient identification was verified, and a caregiver was present when appropriate.   The patient was located at Home: [address] [place] [id number]  Provider was located at Home (Appt Dept State): SHENG Ching (:  2000) is a Established patient, presenting virtually for evaluation of the following:    Assessment & Plan   Below is the assessment and plan developed based on review of pertinent history, physical exam, labs, studies, and medications.  1. Frequent UTI  -     Urinalysis with Microscopic; Future  2. Cellulitis and abscess of right leg  Abscess viewed no erythema, pt reports the center of the abscess is soft and getting smaller  No follow-ups on file.       Subjective   HPI  Pt was in the er for abscess to right thigh. Pt states it started small and then got larger.  It got very painful.  She had to go to the er, they drained it and gave her an antibiotic. She states there is now just a little bump with no surrounding erythema. There has been no drainage since she left the hospital.   Had an incidence of urinating at work without knowing it yesterday. Pt states she was very numb after the lidocaine and wonders if that was a side effect.  Has urinated normally since.  No other sxs.  No back pain.      Review of Systems   Constitutional:  Negative for chills and fever.   Respiratory:  Negative for cough and shortness of breath.    Cardiovascular:  Negative for chest pain, palpitations and leg swelling.   Musculoskeletal:  Negative for back pain.          Objective   Patient-Reported Vitals  No data recorded     Physical Exam  [INSTRUCTIONS:  \"[x]\" Indicates a positive item  \"[]\" Indicates a negative item  -- DELETE

## 2024-02-19 ENCOUNTER — HOSPITAL ENCOUNTER (OUTPATIENT)
Age: 24
Setting detail: SPECIMEN
Discharge: HOME | End: 2024-02-19

## 2024-02-19 DIAGNOSIS — N39.0 FREQUENT UTI: ICD-10-CM

## 2024-02-19 LAB
AMORPH SED URNS QL MICRO: ABNORMAL
BILIRUB UR QL STRIP: NEGATIVE
CLARITY UR: ABNORMAL
COLOR UR: YELLOW
EPI CELLS #/AREA URNS HPF: ABNORMAL /HPF (ref 0–5)
GLUCOSE UR STRIP-MCNC: NEGATIVE MG/DL
HGB UR QL STRIP.AUTO: NEGATIVE
KETONES UR STRIP-MCNC: NEGATIVE MG/DL
LEUKOCYTE ESTERASE UR QL STRIP: NEGATIVE
MUCOUS THREADS URNS QL MICRO: ABNORMAL
NITRITE UR QL STRIP: NEGATIVE
PH UR STRIP: 8 [PH] (ref 5–8)
PROT UR STRIP-MCNC: NEGATIVE MG/DL
RBC #/AREA URNS HPF: ABNORMAL /HPF (ref 0–2)
SP GR UR STRIP: 1.02 (ref 1–1.03)
UROBILINOGEN UR STRIP-ACNC: NORMAL EU/DL (ref 0–1)
WBC #/AREA URNS HPF: ABNORMAL /HPF (ref 0–5)

## 2024-02-21 ENCOUNTER — TELEPHONE (OUTPATIENT)
Dept: FAMILY MEDICINE CLINIC | Age: 24
End: 2024-02-21

## 2024-02-21 NOTE — TELEPHONE ENCOUNTER
----- Message from Jannette Phan sent at 2/21/2024 11:35 AM EST -----  Subject: Results Request    QUESTIONS  Results: Urine Catch Drop off ; Ordered by: Chad Perry   Date Performed: 2024-02-16  ---------------------------------------------------------------------------  --------------  CALL BACK INFO    [phone]; OK to leave message on voicemail  ---------------------------------------------------------------------------  --------------

## 2024-03-21 ENCOUNTER — TELEMEDICINE (OUTPATIENT)
Dept: FAMILY MEDICINE CLINIC | Age: 24
End: 2024-03-21
Payer: COMMERCIAL

## 2024-03-21 DIAGNOSIS — F41.9 ANXIETY: ICD-10-CM

## 2024-03-21 DIAGNOSIS — Z59.819 HOUSING INSTABILITY: Primary | ICD-10-CM

## 2024-03-21 PROCEDURE — 99214 OFFICE O/P EST MOD 30 MIN: CPT | Performed by: NURSE PRACTITIONER

## 2024-03-21 SDOH — ECONOMIC STABILITY - HOUSING INSECURITY: HOUSING INSTABILITY UNSPECIFIED: Z59.819

## 2024-03-21 ASSESSMENT — ENCOUNTER SYMPTOMS
COUGH: 0
SHORTNESS OF BREATH: 0

## 2024-03-21 NOTE — PROGRESS NOTES
Yvonne Ching, was evaluated through a synchronous (real-time) audio-video encounter. The patient (or guardian if applicable) is aware that this is a billable service, which includes applicable co-pays. This Virtual Visit was conducted with patient's (and/or legal guardian's) consent. Patient identification was verified, and a caregiver was present when appropriate.   The patient was located at Home: [address] [place] [address]  Provider was located at Home (Appt Dept State): SHENG Ching (:  2000) is a Established patient, presenting virtually for evaluation of the following:    Assessment & Plan   Below is the assessment and plan developed based on review of pertinent history, physical exam, labs, studies, and medications.  1. Housing instability  -     [facility] Referral for Social Determinants of Health (Primary Care Practices)  2. Anxiety  -     sertraline (ZOLOFT) 50 MG tablet; Take 1 tablet by mouth daily, Disp-30 tablet, R-2Normal  I've explained to her that drugs of the SSRI class can have side effects such as weight gain, sexual dysfunction, insomnia, headache, nausea. These medications are generally effective at alleviating symptoms of anxiety and/or depression. Let me know if significant side effects do occur.    Return in about 1 month (around 2024) for mood.       Subjective   Anxiety  Patient reports no chest pain, palpitations or shortness of breath.         Pt is here to discuss anger and anxiety issues.  She states she has a lot going on.  She says she has been uncontrollably angry.  She \"can't hold her tongue\" when she gets upset. She has been having anxiety. She states she has panic attacks.  She would like to try medication for these problems.  She is currently living with her  in her parents house but her Parents are moving soon and they cannot go with them    Review of Systems   Constitutional:  Negative for chills and fever.   Respiratory:  Negative for cough and

## 2024-03-21 NOTE — PATIENT INSTRUCTIONS
[facility] Housing Resources*  (Call 211 if need more resources)    Area Office on Aging of [facility] ([facility]):  What they offer: Medical cab rides for seniors and referral to community resources  Phone Number: [phone]     [facility] Agency on Aging, District 5:    [place], [place], [place], [place], [place], [place], [place], [place], [facility]:  What they offer: Referral to transportation and other resources for seniors.  Phone Number: [phone]     [facility] Community Action Commission ([employer]OC[employer]):   [place], Cory Puente [place], Eddie Cochran, and [place]ard coundonna  What they offer: referral to community transportation and other resources.  Phone Number: 639.353.7616    [employer]:  What they offer: Emergency and rental assistance  Phone Number: (290) 391-8185      [employer].  What they offer: Transitional housing and shelter for women  Phone Number: (620) 273-6878      [facility]  What they offer: Homeless shelter for homeless men and women with children.  Phone number: 1-600.749.3854  Address: [address] [place] [address]. [facility]:  What they Offer: Homeless shelter for families & Pregnant women  Phone Number: (534) 719-3151 ext: [address] [place] [address]   What they offer: Homeless shelter for men.  Phone number: 221.598.2633  Address: [address] [place] [address]:  What they Offer: Homeless Permanent Supportive Housing for Women with Chronic/Severe Mental Illness, Physical Disabilities, and Substance Abuse Issues  Phone Number: (199) 945-7947    Website: www.ywcanwo.org    [employer] [facility]:  What they offer: Ex-Offender [place] [employer]  Phone Number: (417) 375-7270  Website: www.voago.org    [employer] [facility] ([facility]):  What they offer: Advocacy services for renters and homeowners.  Phone Number: 343.321.4967    [facility]):  What they offer: Shelter and advocacy services for victims of domestic

## 2024-05-29 ENCOUNTER — APPOINTMENT (OUTPATIENT)
Dept: ULTRASOUND IMAGING | Age: 24
End: 2024-05-29
Payer: COMMERCIAL

## 2024-05-29 ENCOUNTER — APPOINTMENT (OUTPATIENT)
Dept: CT IMAGING | Age: 24
End: 2024-05-29
Attending: EMERGENCY MEDICINE
Payer: COMMERCIAL

## 2024-05-29 ENCOUNTER — HOSPITAL ENCOUNTER (EMERGENCY)
Age: 24
Discharge: HOME | End: 2024-05-29
Attending: EMERGENCY MEDICINE
Payer: COMMERCIAL

## 2024-05-29 VITALS
OXYGEN SATURATION: 99 % | SYSTOLIC BLOOD PRESSURE: 120 MMHG | HEIGHT: 60 IN | RESPIRATION RATE: 18 BRPM | DIASTOLIC BLOOD PRESSURE: 61 MMHG | HEART RATE: 88 BPM | BODY MASS INDEX: 33.38 KG/M2 | TEMPERATURE: 98.1 F | WEIGHT: 170 LBS

## 2024-05-29 DIAGNOSIS — R10.30 LOWER ABDOMINAL PAIN: Primary | ICD-10-CM

## 2024-05-29 DIAGNOSIS — N73.0 PID (ACUTE PELVIC INFLAMMATORY DISEASE): ICD-10-CM

## 2024-05-29 DIAGNOSIS — N70.11 HYDROSALPINX: ICD-10-CM

## 2024-05-29 LAB
ALBUMIN SERPL-MCNC: 4.4 G/DL (ref 3.5–5.2)
ALP SERPL-CCNC: 73 U/L (ref 35–104)
ALT SERPL-CCNC: 26 U/L (ref 5–33)
ANION GAP SERPL CALCULATED.3IONS-SCNC: 12 MMOL/L (ref 9–17)
AST SERPL-CCNC: 23 U/L
BACTERIA URNS QL MICRO: ABNORMAL
BASOPHILS # BLD: 0 K/UL (ref 0–0.2)
BASOPHILS NFR BLD: 0 % (ref 0–2)
BILIRUB SERPL-MCNC: 0.2 MG/DL (ref 0.3–1.2)
BILIRUB UR QL STRIP: NEGATIVE
BUN SERPL-MCNC: 9 MG/DL (ref 6–20)
CALCIUM SERPL-MCNC: 9.4 MG/DL (ref 8.6–10.4)
CANDIDA SPECIES: NEGATIVE
CASTS #/AREA URNS LPF: ABNORMAL /LPF
CHLORIDE SERPL-SCNC: 102 MMOL/L (ref 98–107)
CLARITY UR: ABNORMAL
CO2 SERPL-SCNC: 24 MMOL/L (ref 20–31)
COLOR UR: YELLOW
CREAT SERPL-MCNC: 0.6 MG/DL (ref 0.5–0.9)
EOSINOPHIL # BLD: 0.2 K/UL (ref 0–0.4)
EOSINOPHILS RELATIVE PERCENT: 2 % (ref 0–4)
EPI CELLS #/AREA URNS HPF: ABNORMAL /HPF
ERYTHROCYTE [DISTWIDTH] IN BLOOD BY AUTOMATED COUNT: 12.9 % (ref 11.5–14.9)
GARDNERELLA VAGINALIS: POSITIVE
GFR, ESTIMATED: >90 ML/MIN/1.73M2
GLUCOSE SERPL-MCNC: 118 MG/DL (ref 70–99)
GLUCOSE UR STRIP-MCNC: NEGATIVE MG/DL
HCG SERPL QL: NEGATIVE
HCT VFR BLD AUTO: 42.6 % (ref 36–46)
HGB BLD-MCNC: 13.9 G/DL (ref 12–16)
HGB UR QL STRIP.AUTO: NEGATIVE
KETONES UR STRIP-MCNC: ABNORMAL MG/DL
LEUKOCYTE ESTERASE UR QL STRIP: NEGATIVE
LIPASE SERPL-CCNC: 24 U/L (ref 13–60)
LYMPHOCYTES NFR BLD: 3.3 K/UL (ref 1–4.8)
LYMPHOCYTES RELATIVE PERCENT: 33 % (ref 24–44)
MAGNESIUM SERPL-MCNC: 2.1 MG/DL (ref 1.6–2.6)
MCH RBC QN AUTO: 29.9 PG (ref 26–34)
MCHC RBC AUTO-ENTMCNC: 32.6 G/DL (ref 31–37)
MCV RBC AUTO: 91.5 FL (ref 80–100)
MONOCYTES NFR BLD: 0.6 K/UL (ref 0.1–1.3)
MONOCYTES NFR BLD: 6 % (ref 1–7)
NEUTROPHILS NFR BLD: 59 % (ref 36–66)
NEUTS SEG NFR BLD: 6.1 K/UL (ref 1.3–9.1)
NITRITE UR QL STRIP: NEGATIVE
PH UR STRIP: 6.5 [PH] (ref 5–8)
PLATELET # BLD AUTO: 307 K/UL (ref 150–450)
PMV BLD AUTO: 7.5 FL (ref 6–12)
POTASSIUM SERPL-SCNC: 3.8 MMOL/L (ref 3.7–5.3)
PROT SERPL-MCNC: 7.2 G/DL (ref 6.4–8.3)
PROT UR STRIP-MCNC: NEGATIVE MG/DL
RBC # BLD AUTO: 4.66 M/UL (ref 4–5.2)
RBC #/AREA URNS HPF: ABNORMAL /HPF
SODIUM SERPL-SCNC: 138 MMOL/L (ref 135–144)
SOURCE: ABNORMAL
SP GR UR STRIP: 1.02 (ref 1–1.03)
TRICHOMONAS: NEGATIVE
UROBILINOGEN UR STRIP-ACNC: NORMAL EU/DL (ref 0–1)
WBC #/AREA URNS HPF: ABNORMAL /HPF
WBC OTHER # BLD: 10.2 K/UL (ref 3.5–11)

## 2024-05-29 PROCEDURE — 87510 GARDNER VAG DNA DIR PROBE: CPT

## 2024-05-29 PROCEDURE — 2580000003 HC RX 258: Performed by: EMERGENCY MEDICINE

## 2024-05-29 PROCEDURE — 87591 N.GONORRHOEAE DNA AMP PROB: CPT

## 2024-05-29 PROCEDURE — 87480 CANDIDA DNA DIR PROBE: CPT

## 2024-05-29 PROCEDURE — 99285 EMERGENCY DEPT VISIT HI MDM: CPT

## 2024-05-29 PROCEDURE — 87660 TRICHOMONAS VAGIN DIR PROBE: CPT

## 2024-05-29 PROCEDURE — 85025 COMPLETE CBC W/AUTO DIFF WBC: CPT

## 2024-05-29 PROCEDURE — 36415 COLL VENOUS BLD VENIPUNCTURE: CPT

## 2024-05-29 PROCEDURE — 6360000004 HC RX CONTRAST MEDICATION: Performed by: EMERGENCY MEDICINE

## 2024-05-29 PROCEDURE — 96372 THER/PROPH/DIAG INJ SC/IM: CPT

## 2024-05-29 PROCEDURE — 6360000002 HC RX W HCPCS: Performed by: EMERGENCY MEDICINE

## 2024-05-29 PROCEDURE — 93975 VASCULAR STUDY: CPT

## 2024-05-29 PROCEDURE — 96374 THER/PROPH/DIAG INJ IV PUSH: CPT

## 2024-05-29 PROCEDURE — 80053 COMPREHEN METABOLIC PANEL: CPT

## 2024-05-29 PROCEDURE — 76830 TRANSVAGINAL US NON-OB: CPT

## 2024-05-29 PROCEDURE — 87491 CHLMYD TRACH DNA AMP PROBE: CPT

## 2024-05-29 PROCEDURE — 2580000003 HC RX 258

## 2024-05-29 PROCEDURE — 83690 ASSAY OF LIPASE: CPT

## 2024-05-29 PROCEDURE — 6370000000 HC RX 637 (ALT 250 FOR IP): Performed by: EMERGENCY MEDICINE

## 2024-05-29 PROCEDURE — 83735 ASSAY OF MAGNESIUM: CPT

## 2024-05-29 PROCEDURE — 81001 URINALYSIS AUTO W/SCOPE: CPT

## 2024-05-29 PROCEDURE — 74177 CT ABD & PELVIS W/CONTRAST: CPT

## 2024-05-29 PROCEDURE — 84703 CHORIONIC GONADOTROPIN ASSAY: CPT

## 2024-05-29 RX ORDER — DOXYCYCLINE 100 MG/1
100 CAPSULE ORAL ONCE
Status: COMPLETED | OUTPATIENT
Start: 2024-05-29 | End: 2024-05-29

## 2024-05-29 RX ORDER — CEFTRIAXONE 500 MG/1
500 INJECTION, POWDER, FOR SOLUTION INTRAMUSCULAR; INTRAVENOUS ONCE
Status: COMPLETED | OUTPATIENT
Start: 2024-05-29 | End: 2024-05-29

## 2024-05-29 RX ORDER — 0.9 % SODIUM CHLORIDE 0.9 %
100 INTRAVENOUS SOLUTION INTRAVENOUS ONCE
Status: COMPLETED | OUTPATIENT
Start: 2024-05-29 | End: 2024-05-29

## 2024-05-29 RX ORDER — 0.9 % SODIUM CHLORIDE 0.9 %
1000 INTRAVENOUS SOLUTION INTRAVENOUS ONCE
Status: COMPLETED | OUTPATIENT
Start: 2024-05-29 | End: 2024-05-29

## 2024-05-29 RX ORDER — IBUPROFEN 600 MG/1
600 TABLET ORAL EVERY 6 HOURS PRN
Qty: 60 TABLET | Refills: 0 | Status: SHIPPED | OUTPATIENT
Start: 2024-05-29

## 2024-05-29 RX ORDER — METRONIDAZOLE 500 MG/1
500 TABLET ORAL 2 TIMES DAILY
Qty: 28 TABLET | Refills: 0 | Status: SHIPPED | OUTPATIENT
Start: 2024-05-29 | End: 2024-06-12

## 2024-05-29 RX ORDER — WATER 10 ML/10ML
INJECTION INTRAMUSCULAR; INTRAVENOUS; SUBCUTANEOUS
Status: COMPLETED
Start: 2024-05-29 | End: 2024-05-29

## 2024-05-29 RX ORDER — DOXYCYCLINE HYCLATE 100 MG
100 TABLET ORAL 2 TIMES DAILY
Qty: 28 TABLET | Refills: 0 | Status: SHIPPED | OUTPATIENT
Start: 2024-05-29 | End: 2024-06-12

## 2024-05-29 RX ORDER — KETOROLAC TROMETHAMINE 30 MG/ML
15 INJECTION, SOLUTION INTRAMUSCULAR; INTRAVENOUS ONCE
Status: COMPLETED | OUTPATIENT
Start: 2024-05-29 | End: 2024-05-29

## 2024-05-29 RX ORDER — ACETAMINOPHEN 500 MG
1000 TABLET ORAL EVERY 6 HOURS PRN
Qty: 60 TABLET | Refills: 0 | Status: SHIPPED | OUTPATIENT
Start: 2024-05-29

## 2024-05-29 RX ORDER — METRONIDAZOLE 500 MG/1
500 TABLET ORAL ONCE
Status: COMPLETED | OUTPATIENT
Start: 2024-05-29 | End: 2024-05-29

## 2024-05-29 RX ORDER — SODIUM CHLORIDE 0.9 % (FLUSH) 0.9 %
10 SYRINGE (ML) INJECTION PRN
Status: DISCONTINUED | OUTPATIENT
Start: 2024-05-29 | End: 2024-05-29 | Stop reason: HOSPADM

## 2024-05-29 RX ADMIN — IOPAMIDOL 75 ML: 755 INJECTION, SOLUTION INTRAVENOUS at 14:59

## 2024-05-29 RX ADMIN — CEFTRIAXONE SODIUM 500 MG: 500 INJECTION, POWDER, FOR SOLUTION INTRAMUSCULAR; INTRAVENOUS at 17:34

## 2024-05-29 RX ADMIN — SODIUM CHLORIDE 1000 ML: 9 INJECTION, SOLUTION INTRAVENOUS at 14:03

## 2024-05-29 RX ADMIN — SODIUM CHLORIDE 100 ML: 9 INJECTION, SOLUTION INTRAVENOUS at 14:59

## 2024-05-29 RX ADMIN — DOXYCYCLINE 100 MG: 100 CAPSULE ORAL at 17:34

## 2024-05-29 RX ADMIN — SODIUM CHLORIDE, PRESERVATIVE FREE 10 ML: 5 INJECTION INTRAVENOUS at 14:59

## 2024-05-29 RX ADMIN — METRONIDAZOLE 500 MG: 500 TABLET ORAL at 17:34

## 2024-05-29 RX ADMIN — WATER 10 ML: 1 INJECTION INTRAMUSCULAR; INTRAVENOUS; SUBCUTANEOUS at 17:35

## 2024-05-29 RX ADMIN — KETOROLAC TROMETHAMINE 15 MG: 30 INJECTION, SOLUTION INTRAMUSCULAR at 16:41

## 2024-05-29 ASSESSMENT — PAIN - FUNCTIONAL ASSESSMENT: PAIN_FUNCTIONAL_ASSESSMENT: 0-10

## 2024-05-29 ASSESSMENT — PAIN SCALES - GENERAL
PAINLEVEL_OUTOF10: 10
PAINLEVEL_OUTOF10: 10

## 2024-05-29 NOTE — ED TRIAGE NOTES
Patient to emergency department with complaints of rlq pain beginning last night. Was sent from  for r/o appendicitis.

## 2024-05-29 NOTE — ED NOTES
Chaperone for Intimate Exam  Chaperone was offered and accepted as part of the rooming process.  Chaperone: HIRA Torres

## 2024-05-29 NOTE — DISCHARGE INSTR - COC
Labs or Other Treatments After Discharge: ***    Physician Certification: I certify the above information and transfer of Yvonne Ching  is necessary for the continuing treatment of the diagnosis listed and that she requires {Admit to Appropriate Level of Care:49474} for {GREATER/LESS:275809094} 30 days.     Update Admission H&P: {CHP DME Changes in HandP:437181436}    PHYSICIAN SIGNATURE:  {Esignature:[id number]}

## 2024-05-29 NOTE — ED PROVIDER NOTES
motion tenderness and bilateral adnexal tenderness.  Musculoskeletal:         General: No tenderness or deformity. Normal range of motion.      Cervical back: Normal range of motion and neck supple.   Skin:     General: Skin is warm and dry.      Capillary Refill: Capillary refill takes less than 2 seconds.      Findings: No erythema or rash.   Neurological:      General: No focal deficit present.      Mental Status: She is alert and oriented to person, place, and time.      Coordination: Coordination normal.   Psychiatric:         Mood and Affect: Mood normal.         Behavior: Behavior normal.         Thought Content: Thought content normal.         Judgment: Judgment normal.         MEDICAL DECISION MAKING / ED COURSE:         1)  Number and Complexity of Problems Addressed at this Encounter  Problem List This Visit: Right lower quadrant abdominal pain    Differential Diagnosis: Appendicitis, bowel obstruction, colitis, diverticulitis, kidney stone, UTI    Diagnoses Considered but Do Not Suspect: Ectopic pregnancy, TOA, PID    Pertinent Comorbid Conditions: None.    2)  Data Reviewed and Analyzed  (Lab and radiology tests/orders below in next section)  3)  Treatment and Disposition      ED Course as of 05/29/24 1728   Wed May 29, 2024   1541 CT shows fluid around both fallopian tubes.  Ordered ultrasound to rule out TOA or torsion.  Setting up pelvic exam with nurse to check for PID [WM]      ED Course User Index  [WM] Gurjit Wu MD   Giving her Toradol for pain.  Physical exam consistent with PID especially after CT showing bilateral hydrosalpinx.  Checking ultrasound.  Will give her antibiotics and pain control, will discuss case with GYN after imaging results.      Pain well-controlled, discussed with patient outpatient management of PID.  Gonorrhea chlamydia cultures pending.  Treating with Rocephin IM now 500 mg, also Doxy 1 mg twice daily for 14 days, Flagyl 500 mg twice daily for 14 days.  Tylenol

## 2024-05-30 LAB
C TRACH DNA SPEC QL PROBE+SIG AMP: NEGATIVE
N GONORRHOEA DNA SPEC QL PROBE+SIG AMP: NEGATIVE
SPECIMEN DESCRIPTION: NORMAL

## 2024-06-06 ENCOUNTER — TELEMEDICINE (OUTPATIENT)
Dept: FAMILY MEDICINE CLINIC | Age: 24
End: 2024-06-06
Payer: COMMERCIAL

## 2024-06-06 DIAGNOSIS — N73.0 PID (ACUTE PELVIC INFLAMMATORY DISEASE): Primary | ICD-10-CM

## 2024-06-06 PROCEDURE — 99213 OFFICE O/P EST LOW 20 MIN: CPT | Performed by: NURSE PRACTITIONER

## 2024-06-06 SDOH — ECONOMIC STABILITY: FOOD INSECURITY: WITHIN THE PAST 12 MONTHS, YOU WORRIED THAT YOUR FOOD WOULD RUN OUT BEFORE YOU GOT MONEY TO BUY MORE.: NEVER TRUE

## 2024-06-06 SDOH — ECONOMIC STABILITY: FOOD INSECURITY: WITHIN THE PAST 12 MONTHS, THE FOOD YOU BOUGHT JUST DIDN'T LAST AND YOU DIDN'T HAVE MONEY TO GET MORE.: NEVER TRUE

## 2024-06-06 SDOH — ECONOMIC STABILITY: HOUSING INSECURITY
IN THE LAST 12 MONTHS, WAS THERE A TIME WHEN YOU DID NOT HAVE A STEADY PLACE TO SLEEP OR SLEPT IN A SHELTER (INCLUDING NOW)?: YES

## 2024-06-06 SDOH — ECONOMIC STABILITY: INCOME INSECURITY: HOW HARD IS IT FOR YOU TO PAY FOR THE VERY BASICS LIKE FOOD, HOUSING, MEDICAL CARE, AND HEATING?: NOT HARD AT ALL

## 2024-06-06 ASSESSMENT — ENCOUNTER SYMPTOMS
COUGH: 0
SHORTNESS OF BREATH: 0

## 2024-06-06 NOTE — PROGRESS NOTES
Yvonne Ching, was evaluated through a synchronous (real-time) audio-video encounter. The patient (or guardian if applicable) is aware that this is a billable service, which includes applicable co-pays. This Virtual Visit was conducted with patient's (and/or legal guardian's) consent. Patient identification was verified, and a caregiver was present when appropriate.   The patient was located at Home: [address] [place] [address] [place] [address]  Provider was located at Home (Appt Dept State): [place]  Confirm you are appropriately licensed, registered, or certified to deliver care in the state where the patient is located as indicated above. If you are not or unsure, please re-schedule the visit: Yes, I confirm.     Yvonne Ching (:  2000) is a Established patient, presenting virtually for evaluation of the following:    Assessment & Plan   Below is the assessment and plan developed based on review of pertinent history, physical exam, labs, studies, and medications.  1. PID (acute pelvic inflammatory disease)  Resolving, encouraged to continue taking all of the antibiotic.   Continue f/u with ob/gyn next week.   No follow-ups on file.       Subjective   HPI  Pt is here for er follow up.  Per er note:  Pain well-controlled, discussed with patient outpatient management of PID.  Gonorrhea chlamydia cultures pending.  Treating with Rocephin IM now 500 mg, also Doxy 1 mg twice daily for 14 days, Flagyl 500 mg twice daily for 14 days.  Tylenol and ibuprofen for pain.  Patient agrees with the plan.  She has a OB/GYN doctor ERASMO Irwin, who she will follow-up with tomorrow.  I spoke with her GYN physician on-call Dr. Renee, agrees with plan for follow-up outpatient.  She is given anticipatory guidance, discharge directions.  I do not suspect torsion or sepsis or TOA.  Reviewed labs and ultrasound results with the patient.     Ha s f/u on  with ob/gyn  Yvonne reports she still feels pressure but the sharp pain has resolved.   She

## 2024-06-06 NOTE — PROGRESS NOTES
Patient is present for f/u from [place] ED  Patient was in the ED on 5/29 for abd pain  States the pain has improved, states she still has some pressure  Patient does have f/u with OBGYN

## 2024-08-05 DIAGNOSIS — J45.20 MILD INTERMITTENT ASTHMA WITHOUT COMPLICATION: ICD-10-CM

## 2024-08-06 NOTE — TELEPHONE ENCOUNTER
Last visit: 06/06/24  VV  Last Med refill: unknown  Does patient have enough medication for 72 hours: No:     Next Visit Date:  Future Appointments   Date Time Provider Department Center   8/9/2024  9:45 AM Chad Perry, SHANE - CNP [facility] DEP       Health Maintenance   Topic Date Due    Hepatitis B vaccine (1 of 3 - 3-dose series) Never done    Varicella vaccine (1 of 2 - 2-dose childhood series) Never done    HPV vaccine (1 - 2-dose series) Never done    DTaP/Tdap/Td vaccine (1 - Tdap) Never done    Pap smear  Never done    Flu vaccine (1) Never done    Pneumococcal 0-64 years Vaccine (1 of 2 - PCV) 02/08/2025 (Originally 6/10/2004)    COVID-19 Vaccine (1) 02/08/2025 (Originally 2000)    Depression Screen  01/29/2025    Hepatitis A vaccine  Aged Out    Hib vaccine  Aged Out    Polio vaccine  Aged Out    Meningococcal (ACWY) vaccine  Aged Out    Hepatitis C screen  Discontinued    HIV screen  Discontinued    Chlamydia/GC screen  Discontinued       No results found for: \"LABA1C\"          ( goal A1C is < 7)   No components found for: \"LABMICR\"  No components found for: \"LDLCHOLESTEROL\", \"LDLCALC\"    (goal LDL is <100)   AST (U/L)   Date Value   05/29/2024 23     ALT (U/L)   Date Value   05/29/2024 26     BUN (mg/dL)   Date Value   05/29/2024 9     BP Readings from Last 3 Encounters:   05/29/24 120/61   02/13/24 112/65   01/29/24 114/76          (goal 120/80)    All Future Testing planned in [facility]  Lab Frequency Next Occurrence               Patient Active Problem List:     Breast pain in female     Breast lump in female

## 2024-08-08 RX ORDER — ALBUTEROL SULFATE 90 UG/1
AEROSOL, METERED RESPIRATORY (INHALATION)
Qty: 18 G | Refills: 0 | Status: SHIPPED | OUTPATIENT
Start: 2024-08-08

## 2024-08-09 ENCOUNTER — TELEPHONE (OUTPATIENT)
Dept: FAMILY MEDICINE CLINIC | Age: 24
End: 2024-08-09

## 2024-08-09 NOTE — TELEPHONE ENCOUNTER
Patient has missed three visits within a year. Do you want to discharge due to No Show Policy    Dates Missed:  4/25/24 4/29/24 8/9/24

## 2024-10-02 DIAGNOSIS — J45.20 MILD INTERMITTENT ASTHMA WITHOUT COMPLICATION: ICD-10-CM

## 2024-10-02 RX ORDER — ALBUTEROL SULFATE 90 UG/1
INHALANT RESPIRATORY (INHALATION)
Qty: 18 G | Refills: 0 | OUTPATIENT
Start: 2024-10-02

## 2024-10-20 DIAGNOSIS — J45.20 MILD INTERMITTENT ASTHMA WITHOUT COMPLICATION: ICD-10-CM

## 2024-10-20 RX ORDER — ALBUTEROL SULFATE 90 UG/1
INHALANT RESPIRATORY (INHALATION)
Qty: 18 G | Refills: 0 | OUTPATIENT
Start: 2024-10-20

## 2025-01-26 ENCOUNTER — HOSPITAL ENCOUNTER (EMERGENCY)
Age: 25
Discharge: HOME | End: 2025-01-26
Attending: EMERGENCY MEDICINE
Payer: MEDICAID

## 2025-01-26 ENCOUNTER — APPOINTMENT (OUTPATIENT)
Dept: CT IMAGING | Age: 25
End: 2025-01-26
Payer: MEDICAID

## 2025-01-26 ENCOUNTER — APPOINTMENT (OUTPATIENT)
Dept: ULTRASOUND IMAGING | Age: 25
End: 2025-01-26
Payer: MEDICAID

## 2025-01-26 VITALS
SYSTOLIC BLOOD PRESSURE: 117 MMHG | OXYGEN SATURATION: 100 % | TEMPERATURE: 99 F | WEIGHT: 170 LBS | RESPIRATION RATE: 16 BRPM | HEART RATE: 99 BPM | BODY MASS INDEX: 33.2 KG/M2 | DIASTOLIC BLOOD PRESSURE: 65 MMHG

## 2025-01-26 DIAGNOSIS — B96.89 BACTERIAL VAGINOSIS: ICD-10-CM

## 2025-01-26 DIAGNOSIS — N70.11 HYDROSALPINX: ICD-10-CM

## 2025-01-26 DIAGNOSIS — N76.0 BACTERIAL VAGINOSIS: ICD-10-CM

## 2025-01-26 DIAGNOSIS — N73.0 PID (ACUTE PELVIC INFLAMMATORY DISEASE): Primary | ICD-10-CM

## 2025-01-26 DIAGNOSIS — B37.31 CANDIDAL VULVOVAGINITIS: ICD-10-CM

## 2025-01-26 LAB
ALBUMIN SERPL-MCNC: 4.8 G/DL (ref 3.5–5.2)
ALBUMIN/GLOB SERPL: 1.5 {RATIO} (ref 1–2.5)
ALP SERPL-CCNC: 100 U/L (ref 35–104)
ALT SERPL-CCNC: 17 U/L (ref 10–35)
ANION GAP SERPL CALCULATED.3IONS-SCNC: 12 MMOL/L (ref 9–16)
AST SERPL-CCNC: 20 U/L (ref 10–35)
BACTERIA URNS QL MICRO: NORMAL
BASOPHILS # BLD: 0.03 K/UL (ref 0–0.2)
BASOPHILS NFR BLD: 0 % (ref 0–2)
BILIRUB SERPL-MCNC: 0.8 MG/DL (ref 0–1.2)
BILIRUB UR QL STRIP: NEGATIVE
BUN SERPL-MCNC: 8 MG/DL (ref 6–20)
CALCIUM SERPL-MCNC: 9.6 MG/DL (ref 8.6–10.4)
CANDIDA SPECIES: POSITIVE
CASTS #/AREA URNS LPF: NORMAL /LPF (ref 0–8)
CHLORIDE SERPL-SCNC: 101 MMOL/L (ref 98–107)
CLARITY UR: CLEAR
CO2 SERPL-SCNC: 23 MMOL/L (ref 20–31)
COLOR UR: YELLOW
CREAT SERPL-MCNC: 0.8 MG/DL (ref 0.6–0.9)
EOSINOPHIL # BLD: 0.03 K/UL (ref 0–0.44)
EOSINOPHILS RELATIVE PERCENT: 0 % (ref 1–4)
EPI CELLS #/AREA URNS HPF: NORMAL /HPF (ref 0–5)
ERYTHROCYTE [DISTWIDTH] IN BLOOD BY AUTOMATED COUNT: 12 % (ref 11.8–14.4)
GARDNERELLA VAGINALIS: POSITIVE
GFR, ESTIMATED: >90 ML/MIN/1.73M2
GLUCOSE SERPL-MCNC: 104 MG/DL (ref 74–99)
GLUCOSE UR STRIP-MCNC: NEGATIVE MG/DL
HCG SERPL QL: NEGATIVE
HCT VFR BLD AUTO: 42.3 % (ref 36.3–47.1)
HGB BLD-MCNC: 14.1 G/DL (ref 11.9–15.1)
HGB UR QL STRIP.AUTO: NEGATIVE
IMM GRANULOCYTES # BLD AUTO: 0.09 K/UL (ref 0–0.3)
IMM GRANULOCYTES NFR BLD: 1 %
KETONES UR STRIP-MCNC: ABNORMAL MG/DL
LACTIC ACID, SEPSIS WHOLE BLOOD: 1.4 MMOL/L (ref 0.5–1.9)
LEUKOCYTE ESTERASE UR QL STRIP: ABNORMAL
LIPASE SERPL-CCNC: 15 U/L (ref 13–60)
LYMPHOCYTES NFR BLD: 2.8 K/UL (ref 1.1–3.7)
LYMPHOCYTES RELATIVE PERCENT: 15 % (ref 24–43)
MCH RBC QN AUTO: 29.5 PG (ref 25.2–33.5)
MCHC RBC AUTO-ENTMCNC: 33.3 G/DL (ref 28.4–34.8)
MCV RBC AUTO: 88.5 FL (ref 82.6–102.9)
MONOCYTES NFR BLD: 0.92 K/UL (ref 0.1–1.2)
MONOCYTES NFR BLD: 5 % (ref 3–12)
NEUTROPHILS NFR BLD: 79 % (ref 36–65)
NEUTS SEG NFR BLD: 15.18 K/UL (ref 1.5–8.1)
NITRITE UR QL STRIP: NEGATIVE
NRBC BLD-RTO: 0 PER 100 WBC
PH UR STRIP: 6.5 [PH] (ref 5–8)
PLATELET # BLD AUTO: 303 K/UL (ref 138–453)
PMV BLD AUTO: 10 FL (ref 8.1–13.5)
POTASSIUM SERPL-SCNC: 3.8 MMOL/L (ref 3.7–5.3)
PROT SERPL-MCNC: 8 G/DL (ref 6.6–8.7)
PROT UR STRIP-MCNC: NEGATIVE MG/DL
RBC # BLD AUTO: 4.78 M/UL (ref 3.95–5.11)
RBC #/AREA URNS HPF: NORMAL /HPF (ref 0–4)
SODIUM SERPL-SCNC: 136 MMOL/L (ref 136–145)
SOURCE: ABNORMAL
SP GR UR STRIP: 1.02 (ref 1–1.03)
TRICHOMONAS: NEGATIVE
UROBILINOGEN UR STRIP-ACNC: NORMAL EU/DL (ref 0–1)
WBC #/AREA URNS HPF: NORMAL /HPF (ref 0–5)
WBC OTHER # BLD: 19.1 K/UL (ref 3.5–11.3)

## 2025-01-26 PROCEDURE — 99285 EMERGENCY DEPT VISIT HI MDM: CPT | Performed by: EMERGENCY MEDICINE

## 2025-01-26 PROCEDURE — 6360000002 HC RX W HCPCS

## 2025-01-26 PROCEDURE — 96375 TX/PRO/DX INJ NEW DRUG ADDON: CPT | Performed by: EMERGENCY MEDICINE

## 2025-01-26 PROCEDURE — 6360000004 HC RX CONTRAST MEDICATION

## 2025-01-26 PROCEDURE — 96361 HYDRATE IV INFUSION ADD-ON: CPT | Performed by: EMERGENCY MEDICINE

## 2025-01-26 PROCEDURE — 76830 TRANSVAGINAL US NON-OB: CPT

## 2025-01-26 PROCEDURE — 96374 THER/PROPH/DIAG INJ IV PUSH: CPT | Performed by: EMERGENCY MEDICINE

## 2025-01-26 PROCEDURE — 6370000000 HC RX 637 (ALT 250 FOR IP)

## 2025-01-26 PROCEDURE — 74177 CT ABD & PELVIS W/CONTRAST: CPT

## 2025-01-26 PROCEDURE — 96372 THER/PROPH/DIAG INJ SC/IM: CPT | Performed by: EMERGENCY MEDICINE

## 2025-01-26 PROCEDURE — 93976 VASCULAR STUDY: CPT

## 2025-01-26 PROCEDURE — 2580000003 HC RX 258

## 2025-01-26 RX ORDER — METRONIDAZOLE 500 MG/1
500 TABLET ORAL 2 TIMES DAILY
Qty: 14 TABLET | Refills: 0 | Status: SHIPPED | OUTPATIENT
Start: 2025-01-26 | End: 2025-01-26

## 2025-01-26 RX ORDER — DOXYCYCLINE HYCLATE 100 MG
100 TABLET ORAL 2 TIMES DAILY
Qty: 28 TABLET | Refills: 0 | Status: SHIPPED | OUTPATIENT
Start: 2025-01-26 | End: 2025-02-09

## 2025-01-26 RX ORDER — ONDANSETRON 4 MG/1
4 TABLET, FILM COATED ORAL DAILY PRN
Qty: 30 TABLET | Refills: 0 | Status: SHIPPED | OUTPATIENT
Start: 2025-01-26

## 2025-01-26 RX ORDER — FLUCONAZOLE 150 MG/1
150 TABLET ORAL ONCE
Qty: 1 TABLET | Refills: 0 | Status: SHIPPED | OUTPATIENT
Start: 2025-01-26 | End: 2025-01-26

## 2025-01-26 RX ORDER — METRONIDAZOLE 500 MG/1
500 TABLET ORAL ONCE
Status: COMPLETED | OUTPATIENT
Start: 2025-01-26 | End: 2025-01-26

## 2025-01-26 RX ORDER — 0.9 % SODIUM CHLORIDE 0.9 %
1000 INTRAVENOUS SOLUTION INTRAVENOUS ONCE
Status: COMPLETED | OUTPATIENT
Start: 2025-01-26 | End: 2025-01-26

## 2025-01-26 RX ORDER — CEFTRIAXONE 500 MG/1
500 INJECTION, POWDER, FOR SOLUTION INTRAMUSCULAR; INTRAVENOUS ONCE
Status: COMPLETED | OUTPATIENT
Start: 2025-01-26 | End: 2025-01-26

## 2025-01-26 RX ORDER — IOPAMIDOL 755 MG/ML
75 INJECTION, SOLUTION INTRAVASCULAR
Status: COMPLETED | OUTPATIENT
Start: 2025-01-26 | End: 2025-01-26

## 2025-01-26 RX ORDER — CYCLOBENZAPRINE HCL 10 MG
10 TABLET ORAL 3 TIMES DAILY PRN
Qty: 21 TABLET | Refills: 0 | Status: SHIPPED | OUTPATIENT
Start: 2025-01-26 | End: 2025-02-05

## 2025-01-26 RX ORDER — MORPHINE SULFATE 4 MG/ML
4 INJECTION, SOLUTION INTRAMUSCULAR; INTRAVENOUS ONCE
Status: COMPLETED | OUTPATIENT
Start: 2025-01-26 | End: 2025-01-26

## 2025-01-26 RX ORDER — METRONIDAZOLE 500 MG/1
500 TABLET ORAL 2 TIMES DAILY
Qty: 28 TABLET | Refills: 0 | Status: SHIPPED | OUTPATIENT
Start: 2025-01-26 | End: 2025-02-09

## 2025-01-26 RX ORDER — DOXYCYCLINE HYCLATE 100 MG
100 TABLET ORAL ONCE
Status: COMPLETED | OUTPATIENT
Start: 2025-01-26 | End: 2025-01-26

## 2025-01-26 RX ORDER — FLUCONAZOLE 50 MG/1
150 TABLET ORAL ONCE
Status: COMPLETED | OUTPATIENT
Start: 2025-01-26 | End: 2025-01-26

## 2025-01-26 RX ORDER — ONDANSETRON 2 MG/ML
4 INJECTION INTRAMUSCULAR; INTRAVENOUS ONCE
Status: COMPLETED | OUTPATIENT
Start: 2025-01-26 | End: 2025-01-26

## 2025-01-26 RX ADMIN — FLUCONAZOLE 150 MG: 50 TABLET ORAL at 14:40

## 2025-01-26 RX ADMIN — METRONIDAZOLE 500 MG: 500 TABLET ORAL at 14:40

## 2025-01-26 RX ADMIN — SODIUM CHLORIDE 1000 ML: 9 INJECTION, SOLUTION INTRAVENOUS at 09:21

## 2025-01-26 RX ADMIN — IOPAMIDOL 75 ML: 755 INJECTION, SOLUTION INTRAVENOUS at 10:24

## 2025-01-26 RX ADMIN — DOXYCYCLINE HYCLATE 100 MG: 100 TABLET, COATED ORAL at 14:40

## 2025-01-26 RX ADMIN — CEFTRIAXONE SODIUM 500 MG: 500 INJECTION, POWDER, FOR SOLUTION INTRAMUSCULAR; INTRAVENOUS at 14:40

## 2025-01-26 RX ADMIN — MORPHINE SULFATE 4 MG: 4 INJECTION INTRAVENOUS at 09:21

## 2025-01-26 RX ADMIN — ONDANSETRON 4 MG: 2 INJECTION INTRAMUSCULAR; INTRAVENOUS at 09:21

## 2025-01-26 ASSESSMENT — PAIN - FUNCTIONAL ASSESSMENT: PAIN_FUNCTIONAL_ASSESSMENT: 0-10

## 2025-01-26 ASSESSMENT — PAIN SCALES - GENERAL: PAINLEVEL_OUTOF10: 10

## 2025-01-26 ASSESSMENT — PAIN DESCRIPTION - LOCATION: LOCATION: ABDOMEN

## 2025-01-26 NOTE — ED NOTES
EP Fellow:  Follow up note:      Ms. Elizabeth Carmen is a 27 yo F with a pmhx of HFrEF (LVEF 30-35%) 2/2 NICM. Originally presented on 12/6 from an OSH for her newly decompensated HFrEF in the setting of a high risk OB delivery. EP is now re-consulted for recommendations of frequent arrhythmia on telemetry.      EP re consulted given frequency of ectopy on telemetry. This has been a persistent and recurring issue during this hospitalization requiring escalating doses of beta blockers (currently lopressor 100 mg q6h).  We recommended sotalol initiation.      Today we were called as pt had a run of 17-20 seconds of wide complex tachycardia, most likely VT, started by fusion beat /PVC and got faster up to 250-300 ms (occurred at 1/21/23 at 4:18:21), possibly of automaticity origin,  pt at the time was symptomatic with chest tightened and sob. The team wants to discuss if the date of delivery should be advanced possibly in the next few days. Pt now on her metoprolol 50 q6 and sotalol 80 bid, repeat EKG today with QTC of 480, on EKG PVC was seen could be an outflow tract PVC, however the PT has 2 different PVCs on her telemetry,  creatinine is 0.57.  Plan:If the pt will go for delivery soon, we can keep the same dose of sotalol at time being.     - SCD prophylaxis: Not currently indicated due to newly diagnosed HF/CM.  Will need to follow post partum after GDMT to assess if recovery or if LVEF remains suppressed after several months of treatment then may need to consider defibrillator.    EP STAFF NOTE  Patient seen and examined and management plan personally reviewed with the patient. I agree with the note above by the CV/EP fellow.  Mayur Taylor MD [facility]  Cardiology - Electrophysiology  Total time spent on patient visit, reviewing notes, imaging, labs, orders, and completing necessary documentation: 30 minutes.  >50% of visit spent on counseling patient and/or coordination of care.     Pt returned from US.

## 2025-01-26 NOTE — ED PROVIDER NOTES
[facility] EMERGENCY DEPARTMENT  eMERGENCY dEPARTMENT eNCOUnter   Attending Attestation     Pt Name: Yvonne Ching  MRN: [id number]  Birthdate 2000  Date of evaluation: 1/26/25       Yvonne Ching is a 24 y.o. female who presents with Abdominal Pain      9:37 AM EST      History: Patient presents with severe abdominal pain.  Patient is bit chills.  Patient did have history of having appendicitis that was conservatively managed with antibiotics.  This was approximate 1 year ago.    Exam: Heart rate and rhythm are regular.  Lungs are clear.  Abdomen is soft, bowel sounds decreased, patient is severely tender over the abdomen.    Concern for pelvic origin versus more likely appendicitis will get CT, will follow-up with transvaginal ultrasound possibly pelvic if required.    I performed a history and physical examination of the patient and discussed management with the resident. I reviewed the resident’s note and agree with the documented findings and plan of care. Any areas of disagreement are noted on the chart. I was personally present for the key portions of any procedures. I have documented in the chart those procedures where I was not present during the key portions. I have personally reviewed all images and agree with the resident's interpretation. I have reviewed the emergency nurses triage note. I agree with the chief complaint, past medical history, past surgical history, allergies, medications, social and family history as documented unless otherwise noted below. Documentation of the HPI, Physical Exam and Medical Decision Making performed by medical students or scribes is based on my personal performance of the HPI, PE and MDM. For Phys Assistant/ Nurse Practitioner cases/documentation I have had a face to face evaluation of this patient and have completed at least one if not all key elements of the E/M (history, physical exam, and MDM). Additional findings are as noted.    For APC cases I have 
Concern    Not on file   Social History Narrative    Not on file     Social Determinants of Health     Financial Resource Strain: Low Risk  (6/6/2024)    Overall Financial Resource Strain (CARDIA)     Difficulty of Paying Living Expenses: Not hard at all   Food Insecurity: No Food Insecurity (6/6/2024)    Hunger Vital Sign     Worried About Running Out of Food in the Last Year: Never true     Ran Out of Food in the Last Year: Never true   Transportation Needs: Unknown (6/6/2024)    PRAPARE - Transportation     Lack of Transportation (Medical): Not on file     Lack of Transportation (Non-Medical): No   Physical Activity: Not on file   Stress: Not on file   Social Connections: Not on file   Intimate Partner Violence: Not on file   Housing Stability: High Risk (6/6/2024)    Housing Stability Vital Sign     Unable to Pay for Housing in the Last Year: Not on file     Number of Places Lived in the Last Year: Not on file     Unstable Housing in the Last Year: Yes       Family History   Problem Relation Age of Onset    Asthma Mother     Diabetes Mother     Cancer Maternal Aunt     Cancer Maternal Grandfather         lung    Cancer Paternal Grandmother         lung       Allergies:  Patient has no known allergies.    Home Medications:  Prior to Admission medications    Medication Sig Start Date End Date Taking? Authorizing Provider   doxycycline hyclate (VIBRA-TABS) 100 MG tablet Take 1 tablet by mouth 2 times daily for 14 days 1/26/25 2/9/25 Yes Jo Barrios DO   ondansetron (ZOFRAN) 4 MG tablet Take 1 tablet by mouth daily as needed for Nausea or Vomiting 1/26/25  Yes Jo Barrios DO   cyclobenzaprine (FLEXERIL) 10 MG tablet Take 1 tablet by mouth 3 times daily as needed for Muscle spasms 1/26/25 2/5/25 Yes Jo Barrios DO   metroNIDAZOLE (FLAGYL) 500 MG tablet Take 1 tablet by mouth 2 times daily for 14 days 1/26/25 2/9/25 Yes Luis Bradley MD   fluconazole (DIFLUCAN) 150 MG tablet Take 1 tablet by mouth once

## 2025-01-26 NOTE — CONSULTS
OB/GYN Consult  [facility]    Patient Name: Yvonne Ching     Patient : 2000  Room/Bed: 30  Admission Date/Time: 2025  8:38 AM  Primary Care Physician: No primary care provider on file.    Consulting Provider: Dr. Jaime  Reason for Consult: Abdominal Pain, abnormal cystic structures seen on ultrasound and CT    CC:   Chief Complaint   Patient presents with    Abdominal Pain                HPI: Yvonne Ching is a 24 y.o. female  presents to the ED, c/o diffuse lower abdominal pain. Patient's last menstrual period was 2025. Patient states that yesterday she began to have diffuse abdominal pain and has only been drinking liquids.Patient states it feels similar to last year when she was being worked up for appendicitis. CT A/P shows peripherally enhancing tubular cystic type structures in the bilateral adnexa as detailed above measuring up to 8.7 cm.  Hydrosalpinx or pyosalpinx with pelvic inflammatory disease are within the differential. Small amount of free fluid in the pelvis.    Given results of CT, a pelvic ultrasound was ordered which shows  Left-sided ovarian hemorrhagic cyst versus hydrosalpinx which measures approximately 3.5 x 2.9 x 3.7 cm.  This is new when compared to the prior exam.  Tubular structure is seen in the right/left adnexa. Right sided hydrosalpinx is noted. No evidence of free fluid.    On review of labs, Hgb stable at 14.1. WBC elevated at 19.1. Lipase and LA wnl. Sterile pyuria seen with low clinical suspicion for UTI given no urinary s/s. Vaginitis + for BV and Candida. GCC and Ucx pending. Patient does not have a charted history of GCC and denies a history of STIs.    At this time, patient states pain has improved with morphine x1.      REVIEW OF SYSTEMS:   Constitutional: negative fever, negative chills, negative weight changes   HEENT: negative visual disturbances, negative headaches, negative dizziness, negative hearing loss  Breast: Negative

## 2025-01-26 NOTE — ED NOTES
Medical student at bedside to evaluate pt.   Pt to ed with mother from home.   Pt c/o diffuse abdominal pain. Pt states she has history of appendicitis, without removing the appendix. Pt is tender to all abdominal quadrants, denies nausea, vomiting, does report diarrhea but drank pure juice since she had not had a bowel movement in several days.   Pt rates pain 10/10. Pt states pain is sharp, stabbing pain.

## 2025-01-26 NOTE — DISCHARGE INSTRUCTIONS
You were seen in the emergency department for lower abdominal pain since yesterday.  Your vital signs were stable.  No fever noted.    Lab work showed an elevated white blood cell count.    CT scan showed bilateral hydrosalpinx with concern for cystic structures on your ovaries.  Transvaginal ultrasound showed good blood flow to your ovaries.    You did test positive for bacterial vaginosis as well as a yeast infection.    We spoke with OB/GYN who wants to treat you as pelvic inflammatory disease at this time.    They plan to start you on antibiotics for the next 2 weeks.  Please take doxycycline and Flagyl as prescribed for the next 2 weeks.    Do not drink alcohol while taking Flagyl as you can have poor side effects.    I have also sent another dose of Diflucan to the pharmacy.  You may take this after completing the antibiotics.    OB has also prescribed you a muscle relaxant called Flexeril.  Recommend only taking this before bed as it can make you drowsy.  Please do not drive or operate machinery after taking this medication.    Please call Dr. Keys's office to schedule an appointment for follow-up.    Please follow-up with your primary care provider.    Please return to the emergency department if your pain returns or worsens or if you develop nausea, vomiting, fevers, excessive vaginal bleeding, or any other concerning symptoms.

## 2025-01-26 NOTE — ED NOTES
Pt resting on cot, alert, oriented, no distress noted. Pt awaiting US results to determine plan of care. Pt states she is comfortable, does not want any more pain medication at this time.

## 2025-01-27 LAB
C TRACH DNA SPEC QL PROBE+SIG AMP: NEGATIVE
MICROORGANISM SPEC CULT: NORMAL
N GONORRHOEA DNA SPEC QL PROBE+SIG AMP: NEGATIVE
SPECIMEN DESCRIPTION: NORMAL
SPECIMEN DESCRIPTION: NORMAL

## 2025-01-31 ENCOUNTER — OFFICE VISIT (OUTPATIENT)
Dept: OBGYN CLINIC | Age: 25
End: 2025-01-31
Payer: MEDICAID

## 2025-01-31 VITALS
HEIGHT: 60 IN | RESPIRATION RATE: 18 BRPM | DIASTOLIC BLOOD PRESSURE: 68 MMHG | WEIGHT: 162 LBS | HEART RATE: 78 BPM | BODY MASS INDEX: 31.8 KG/M2 | SYSTOLIC BLOOD PRESSURE: 114 MMHG

## 2025-01-31 DIAGNOSIS — N92.6 IRREGULAR MENSES: ICD-10-CM

## 2025-01-31 DIAGNOSIS — N83.202 LEFT OVARIAN CYST: Primary | ICD-10-CM

## 2025-01-31 PROBLEM — F41.9 ANXIETY: Status: ACTIVE | Noted: 2024-05-29

## 2025-01-31 LAB
MICROORGANISM SPEC CULT: NORMAL
SERVICE CMNT-IMP: NORMAL
SPECIMEN DESCRIPTION: NORMAL

## 2025-01-31 PROCEDURE — 99203 OFFICE O/P NEW LOW 30 MIN: CPT | Performed by: OBSTETRICS & GYNECOLOGY

## 2025-01-31 RX ORDER — FLUCONAZOLE 150 MG/1
TABLET ORAL
COMMUNITY
Start: 2025-01-26

## 2025-01-31 NOTE — PROGRESS NOTES
UA Clear Clear    Glucose, Ur NEGATIVE NEGATIVE mg/dL    Bilirubin, Urine NEGATIVE NEGATIVE    Ketones, Urine TRACE (A) NEGATIVE mg/dL    Specific Gravity, UA 1.023 1.005 - 1.030    Urine Hgb NEGATIVE NEGATIVE    pH, Urine 6.5 5.0 - 8.0    Protein, UA NEGATIVE NEGATIVE mg/dL    Urobilinogen, Urine Normal 0.0 - 1.0 EU/dL    Nitrite, Urine NEGATIVE NEGATIVE    Leukocyte Esterase, Urine MODERATE (A) NEGATIVE   Microscopic Urinalysis   Result Value Ref Range    WBC, UA 10 TO 20 0 - 5 /HPF    RBC, UA 0 TO 2 0 - 4 /HPF    Casts UA  0 - 8 /LPF     None Reference range defined for non-centrifuged specimen.    Epithelial Cells, UA 5 TO 10 0 - 5 /HPF    Bacteria, UA None None         Assessment:   Diagnosis Orders   1. Left ovarian cyst        2. Irregular menses          Patient Active Problem List    Diagnosis Date Noted    Anxiety 05/29/2024    Breast pain in female 02/08/2021    Breast lump in female 02/08/2021           PLAN:  Return in about 3 months (around 4/30/2025) for Follow up current problem.  Repeat sono 2-3 months  Repeat Annual every 1 year  Cervical Cytology Evaluation begins at 21 years old.  If Negative Cytology, Follow-up screening per current guidelines.   Return to the office in 12 weeks.  Counseled on preventative health maintenance follow-up.  No orders of the defined types were placed in this encounter.          The patient, Yvonne Ching is a 24 y.o. female, was seen with a total time spent of 30 minutes for the visit on this date of service by the E/M provider. The time component had both face to face and non face to face time spent in determining the total time component.  Counseling and education regarding her diagnosis listed below and her options regarding those diagnoses were also included in determining her time component.      Diagnosis Orders   1. Left ovarian cyst        2. Irregular menses             The patient had her preventative health maintenance recommendations and follow-up reviewed

## 2025-02-19 ENCOUNTER — TELEPHONE (OUTPATIENT)
Dept: OBGYN CLINIC | Age: 25
End: 2025-02-19

## 2025-02-19 DIAGNOSIS — N83.202 LEFT OVARIAN CYST: Primary | ICD-10-CM

## 2025-02-19 DIAGNOSIS — N92.6 IRREGULAR MENSES: ICD-10-CM

## 2025-02-19 NOTE — TELEPHONE ENCOUNTER
[facility] Result Note Patient Contact Communication   [address] [place] [address]      02/19/25   12:15 PM     Patients Name: Yvonne Ching   Medical Record Number: [id number]   Contact Serial Number: [id number]  YOB: 2000       Patients Phone Number: [phone]     Description of Recommendations:  The patient was contacted and her identity was confirmed with two of the specific identifiers listed above.  The information regarding her recent testing results and provider recommendations were reviewed with her in detail and all questions were answered.   Recent labs/Cultures/ Imaging Studies/Pathology reports and Urinalysis results are included:      Recommendations given by provider:  Left ovarian complex cyst noted.  Please order age based tumor markers.  Please order quant HCG.  Instruct patient on torsion restrictions.  Patient previously completed labs and vaginal cultures.  Please order pelvic MRI with and without contrast.   To keep follow up with physician on 3/5 with DR Keys to review results and recommendations.    The patient verbalized understanding of the information above and the recommendation by the provider. She will contact her PCP if any other questions arise or contact our office for any other clarifications.        Electronically signed by Montserrat Townsend MA on 2/19/2025 at 12:15 PM

## 2025-02-19 NOTE — TELEPHONE ENCOUNTER
----- Message from SHANE Albarado CNP sent at 2/18/2025  5:15 PM EST -----  Left ovarian complex cyst noted.  Please order age based tumor markers.  Please order quant HCG.  Instruct patient on torsion restrictions.  Patient previously completed labs and vaginal cultures.  Please order pelvic MRI with and without contrast.   To keep follow up with physician on 3/5 with DR Keys to review results and recommendations.

## 2025-02-21 ENCOUNTER — TELEPHONE (OUTPATIENT)
Dept: OBGYN CLINIC | Age: 25
End: 2025-02-21

## 2025-02-21 NOTE — TELEPHONE ENCOUNTER
Pt requesting note for work for any torsion restrictions she has in result of recent US performed.

## 2025-03-05 ENCOUNTER — TELEMEDICINE (OUTPATIENT)
Dept: OBGYN CLINIC | Age: 25
End: 2025-03-05
Payer: MEDICAID

## 2025-03-05 DIAGNOSIS — N83.202 LEFT OVARIAN CYST: Primary | ICD-10-CM

## 2025-03-05 DIAGNOSIS — N92.6 IRREGULAR MENSES: ICD-10-CM

## 2025-03-05 PROCEDURE — 99213 OFFICE O/P EST LOW 20 MIN: CPT | Performed by: OBSTETRICS & GYNECOLOGY

## 2025-03-05 NOTE — PROGRESS NOTES
Patient identification was verified, and a caregiver was present when appropriate. The patient (and/or legal guardian) has also been advised to contact this office for worsening conditions or problems, and seek emergency medical treatment and/or call 911 if deemed necessary.  The patient was located at Home: [address] [place] [id number]  Provider was located at Home ([place])    STOP! Confirm you are appropriately licensed, registered, or certified to deliver care in the state where the patient is located as indicated above. If you are not or unsure, please re-schedule the visit.    Are you appropriately licensed in the patient's state?: Yes         Electronically signed by Nerissa Dyer DO on 3/5/2025 at 8:43 AM    An electronic signature was used to authenticate this note.

## 2025-03-10 ENCOUNTER — OFFICE VISIT (OUTPATIENT)
Dept: FAMILY MEDICINE CLINIC | Age: 25
End: 2025-03-10
Payer: MEDICAID

## 2025-03-10 VITALS
BODY MASS INDEX: 32.39 KG/M2 | DIASTOLIC BLOOD PRESSURE: 80 MMHG | HEIGHT: 60 IN | OXYGEN SATURATION: 98 % | WEIGHT: 165 LBS | HEART RATE: 84 BPM | SYSTOLIC BLOOD PRESSURE: 130 MMHG

## 2025-03-10 DIAGNOSIS — Z11.59 SCREENING FOR VIRAL DISEASE: ICD-10-CM

## 2025-03-10 DIAGNOSIS — F41.9 ANXIETY: Primary | ICD-10-CM

## 2025-03-10 DIAGNOSIS — R53.83 OTHER FATIGUE: ICD-10-CM

## 2025-03-10 DIAGNOSIS — Z13.1 SCREENING FOR DIABETES MELLITUS: ICD-10-CM

## 2025-03-10 DIAGNOSIS — L73.9 FOLLICULITIS: ICD-10-CM

## 2025-03-10 DIAGNOSIS — J45.20 MILD INTERMITTENT ASTHMA WITHOUT COMPLICATION: ICD-10-CM

## 2025-03-10 DIAGNOSIS — Z13.220 SCREENING FOR HYPERLIPIDEMIA: ICD-10-CM

## 2025-03-10 PROCEDURE — 99204 OFFICE O/P NEW MOD 45 MIN: CPT | Performed by: NURSE PRACTITIONER

## 2025-03-10 RX ORDER — SULFAMETHOXAZOLE AND TRIMETHOPRIM 800; 160 MG/1; MG/1
1 TABLET ORAL 2 TIMES DAILY
Qty: 14 TABLET | Refills: 0 | Status: SHIPPED | OUTPATIENT
Start: 2025-03-10 | End: 2025-03-17

## 2025-03-10 RX ORDER — ALBUTEROL SULFATE 90 UG/1
2 INHALANT RESPIRATORY (INHALATION) 4 TIMES DAILY PRN
Qty: 18 G | Refills: 0 | Status: SHIPPED | OUTPATIENT
Start: 2025-03-10

## 2025-03-10 RX ORDER — DOXYCYCLINE HYCLATE 100 MG
100 TABLET ORAL 2 TIMES DAILY
Qty: 14 TABLET | Refills: 0 | Status: SHIPPED | OUTPATIENT
Start: 2025-03-10 | End: 2025-03-10 | Stop reason: SINTOL

## 2025-03-10 RX ORDER — MUPIROCIN 20 MG/G
OINTMENT TOPICAL
Qty: 30 G | Refills: 3 | Status: SHIPPED | OUTPATIENT
Start: 2025-03-10

## 2025-03-10 SDOH — ECONOMIC STABILITY: FOOD INSECURITY: WITHIN THE PAST 12 MONTHS, THE FOOD YOU BOUGHT JUST DIDN'T LAST AND YOU DIDN'T HAVE MONEY TO GET MORE.: NEVER TRUE

## 2025-03-10 SDOH — ECONOMIC STABILITY: FOOD INSECURITY: WITHIN THE PAST 12 MONTHS, YOU WORRIED THAT YOUR FOOD WOULD RUN OUT BEFORE YOU GOT MONEY TO BUY MORE.: NEVER TRUE

## 2025-03-10 ASSESSMENT — ENCOUNTER SYMPTOMS
BACK PAIN: 0
COUGH: 0
DIARRHEA: 0
BLOOD IN STOOL: 0
SHORTNESS OF BREATH: 0
ABDOMINAL DISTENTION: 0
WHEEZING: 1
CONSTIPATION: 0
NAUSEA: 0
CHEST TIGHTNESS: 0
VOMITING: 0
ABDOMINAL PAIN: 0

## 2025-03-10 NOTE — PROGRESS NOTES
Visit Information    Have you changed or started any medications since your last visit including any over-the-counter medicines, vitamins, or herbal medicines? no   Are you having any side effects from any of your medications? -  no  Have you stopped taking any of your medications? Is so, why? -  no    Have you seen any other physician or provider since your last visit? No  Have you had any other diagnostic tests since your last visit? No  Have you been seen in the emergency room and/or had an admission to a hospital since we last saw you? No  Have you had your routine dental cleaning in the past 6 months? no    Have you activated your [employer] account? If not, what are your barriers? Yes     Patient Care Team:  Frank Esteves APRN - CNP as PCP - General (Family Medicine)    Medical History Review  Past Medical, Family, and Social History reviewed and does contribute to the patient presenting condition    Health Maintenance   Topic Date Due    Varicella vaccine (1 of 2 - 13+ 2-dose series) Never done    HPV vaccine (1 - 3-dose series) Never done    Hepatitis B vaccine (1 of 3 - 19+ 3-dose series) Never done    DTaP/Tdap/Td vaccine (1 - Tdap) Never done    Pneumococcal 0-49 years Vaccine (1 of 2 - PCV) Never done    Pap smear  Never done    Flu vaccine (1) Never done    COVID-19 Vaccine (1 - 2024-25 season) Never done    Depression Screen  01/29/2025    Hepatitis A vaccine  Aged Out    Hib vaccine  Aged Out    Polio vaccine  Aged Out    Meningococcal (ACWY) vaccine  Aged Out    Meningococcal B vaccine  Aged Out    Hepatitis C screen  Discontinued    HIV screen  Discontinued    Chlamydia/GC screen  Discontinued     
Judgment normal.         I personally reviewed testing with patient, and all questions answered.      Lab Results   Component Value Date    WBC 19.1 (H) 01/26/2025    HGB 14.1 01/26/2025    HCT 42.3 01/26/2025    MCV 88.5 01/26/2025     01/26/2025       Lab Results   Component Value Date/Time     01/26/2025 09:32 AM    K 3.8 01/26/2025 09:32 AM     01/26/2025 09:32 AM    CO2 23 01/26/2025 09:32 AM    BUN 8 01/26/2025 09:32 AM    CREATININE 0.8 01/26/2025 09:32 AM    GLUCOSE 104 01/26/2025 09:32 AM    CALCIUM 9.6 01/26/2025 09:32 AM        Lab Results   Component Value Date    ALT 17 01/26/2025    AST 20 01/26/2025    ALKPHOS 100 01/26/2025    BILITOT 0.8 01/26/2025       Lab Results   Component Value Date    TSH 1.37 03/07/2022       No results found for: \"CHOL\"  No results found for: \"TRIG\"  No results found for: \"HDL\"  No components found for: \"LDLCALC\", \"LDLCHOLESTEROL\"  No results found for: \"CHOLHDLRATIO\"      No results found for: \"VYTLUTKK76\"  No results found for: \"FOLATE\"  No results found for: \"VITD25\"      Return in about 6 months (around 9/10/2025).      This note was completed by using the assistance of a speech-recognition program. However, inadvertent computerized transcription errors may be present. Although every effort was made to ensure accuracy, no guarantees can be provided that every mistake has been identified and corrected by editing.      An electronic signature was used to authenticate this note.  Electronically signed by SAHNE Kumar CNP on 3/10/2025 at 12:38 PM

## 2025-03-28 ENCOUNTER — HOSPITAL ENCOUNTER (OUTPATIENT)
Age: 25
Discharge: HOME | End: 2025-03-28
Payer: MEDICAID

## 2025-03-28 ENCOUNTER — HOSPITAL ENCOUNTER (OUTPATIENT)
Dept: MRI IMAGING | Age: 25
Discharge: HOME | End: 2025-03-30
Payer: MEDICAID

## 2025-03-28 DIAGNOSIS — N92.6 IRREGULAR MENSES: ICD-10-CM

## 2025-03-28 LAB — HCG UR QL: NEGATIVE

## 2025-03-28 PROCEDURE — 72195 MRI PELVIS W/O DYE: CPT

## 2025-03-28 PROCEDURE — 81025 URINE PREGNANCY TEST: CPT

## 2025-03-28 NOTE — PLAN OF CARE
Pt arrived for MRI appointment, possibility of pregnancy. Dr. Bennett order pregnancy test, negative. Attempted IV x 2, both unsuccessful, pt refusing to be poked again, refusing IV contrast at this time, ok to continue without only per Dr. Ferraro

## 2025-04-01 ENCOUNTER — RESULTS FOLLOW-UP (OUTPATIENT)
Dept: OBGYN CLINIC | Age: 25
End: 2025-04-01

## 2025-04-01 NOTE — TELEPHONE ENCOUNTER
----- Message from SHANE Albarado CNP sent at 4/1/2025  2:09 PM EDT -----  5.6cm right ovarian cyst, bilateral hydrosalpinx.  Please encourage patient to complete bloodwork soon, including tumor markers.  Instruct patient on torsion restrictions.  To keep follow up with Dr Keys on 4/18 to discuss MRI and plan of care. Patient to complete bloodwork soon so it will be back by her follow up appointment.

## 2025-04-01 NOTE — TELEPHONE ENCOUNTER
[facility] Result Note Patient Contact Communication   [address] [place] [address]      04/01/25   4:38 PM     Patients Name: Yvonne Ching   Medical Record Number: [id number]   Contact Serial Number: [id number]  YOB: 2000       Patients Phone Number: [phone]     Description of Recommendations:  The patient was contacted and her identity was confirmed with two of the specific identifiers listed above.  The information regarding her recent testing results and provider recommendations were reviewed with her in detail and all questions were answered.   Recent labs/Cultures/ Imaging Studies/Pathology reports and Urinalysis results are included:      Recommendations given by provider:  5.6cm right ovarian cyst, bilateral hydrosalpinx.  Please encourage patient to complete bloodwork soon, including tumor markers.  Instruct patient on torsion restrictions.  To keep follow up with Dr Keys on 4/18 to discuss MRI and plan of care. Patient to complete bloodwork soon so it will be back by her follow up appointment.     Pt states she will have labs completed this week.     The patient verbalized understanding of the information above and the recommendation by the provider. She will contact her PCP if any other questions arise or contact our office for any other clarifications.        Electronically signed by Montserrat Townsend MA on 4/1/2025 at 4:38 PM

## 2025-04-03 ENCOUNTER — HOSPITAL ENCOUNTER (OUTPATIENT)
Age: 25
Discharge: HOME | End: 2025-04-03
Payer: MEDICAID

## 2025-04-03 DIAGNOSIS — N83.202 LEFT OVARIAN CYST: ICD-10-CM

## 2025-04-03 DIAGNOSIS — N92.6 IRREGULAR MENSES: ICD-10-CM

## 2025-04-03 LAB
ALBUMIN SERPL-MCNC: 4.7 G/DL (ref 3.5–5.2)
ALP SERPL-CCNC: 79 U/L (ref 35–104)
ALT SERPL-CCNC: 19 U/L (ref 10–35)
ANION GAP SERPL CALCULATED.3IONS-SCNC: 10 MMOL/L (ref 9–16)
AST SERPL-CCNC: 21 U/L (ref 10–35)
BILIRUB SERPL-MCNC: <0.2 MG/DL (ref 0–1.2)
BUN SERPL-MCNC: 9 MG/DL (ref 6–20)
CALCIUM SERPL-MCNC: 9.8 MG/DL (ref 8.6–10.4)
CHLORIDE SERPL-SCNC: 105 MMOL/L (ref 98–107)
CO2 SERPL-SCNC: 28 MMOL/L (ref 20–31)
CREAT SERPL-MCNC: 0.8 MG/DL (ref 0.7–1.2)
EST. AVERAGE GLUCOSE BLD GHB EST-MCNC: 103 MG/DL
FSH SERPL-ACNC: 7.3 MIU/ML
GFR, ESTIMATED: >90 ML/MIN/1.73M2
GLUCOSE SERPL-MCNC: 95 MG/DL (ref 74–99)
HBA1C MFR BLD: 5.2 % (ref 4–6)
LH SERPL-ACNC: 17.6 MIU/ML (ref 1.7–8.6)
POTASSIUM SERPL-SCNC: 3.9 MMOL/L (ref 3.7–5.3)
PROT SERPL-MCNC: 7.5 G/DL (ref 6.6–8.7)
SODIUM SERPL-SCNC: 143 MMOL/L (ref 136–145)

## 2025-04-03 PROCEDURE — 36415 COLL VENOUS BLD VENIPUNCTURE: CPT

## 2025-04-03 PROCEDURE — 83002 ASSAY OF GONADOTROPIN (LH): CPT

## 2025-04-03 PROCEDURE — 80053 COMPREHEN METABOLIC PANEL: CPT

## 2025-04-03 PROCEDURE — 83036 HEMOGLOBIN GLYCOSYLATED A1C: CPT

## 2025-04-03 PROCEDURE — 83001 ASSAY OF GONADOTROPIN (FSH): CPT

## 2025-04-04 ENCOUNTER — RESULTS FOLLOW-UP (OUTPATIENT)
Dept: OBGYN CLINIC | Age: 25
End: 2025-04-04

## 2025-04-07 ENCOUNTER — HOSPITAL ENCOUNTER (OUTPATIENT)
Age: 25
Discharge: HOME | End: 2025-04-07
Payer: MEDICAID

## 2025-04-07 DIAGNOSIS — Z13.220 SCREENING FOR HYPERLIPIDEMIA: ICD-10-CM

## 2025-04-07 DIAGNOSIS — N92.6 IRREGULAR MENSES: ICD-10-CM

## 2025-04-07 DIAGNOSIS — R53.83 OTHER FATIGUE: ICD-10-CM

## 2025-04-07 DIAGNOSIS — Z11.59 SCREENING FOR VIRAL DISEASE: ICD-10-CM

## 2025-04-07 DIAGNOSIS — Z13.1 SCREENING FOR DIABETES MELLITUS: ICD-10-CM

## 2025-04-07 LAB
25(OH)D3 SERPL-MCNC: 26.6 NG/ML (ref 30–100)
ALBUMIN SERPL-MCNC: 4.5 G/DL (ref 3.5–5.2)
ALP SERPL-CCNC: 75 U/L (ref 35–104)
ALT SERPL-CCNC: 21 U/L (ref 10–35)
ANION GAP SERPL CALCULATED.3IONS-SCNC: 11 MMOL/L (ref 9–16)
AST SERPL-CCNC: 21 U/L (ref 10–35)
BILIRUB SERPL-MCNC: 0.3 MG/DL (ref 0–1.2)
BUN SERPL-MCNC: 9 MG/DL (ref 6–20)
CALCIUM SERPL-MCNC: 9.5 MG/DL (ref 8.6–10.4)
CHLORIDE SERPL-SCNC: 106 MMOL/L (ref 98–107)
CHOLEST SERPL-MCNC: 170 MG/DL (ref 0–199)
CHOLESTEROL/HDL RATIO: 3.3
CO2 SERPL-SCNC: 24 MMOL/L (ref 20–31)
CREAT SERPL-MCNC: 0.8 MG/DL (ref 0.7–1.2)
ERYTHROCYTE [DISTWIDTH] IN BLOOD BY AUTOMATED COUNT: 13.7 % (ref 11.5–14.9)
EST. AVERAGE GLUCOSE BLD GHB EST-MCNC: 103 MG/DL
FOLATE SERPL-MCNC: 13.1 NG/ML (ref 4.8–24.2)
GFR, ESTIMATED: >90 ML/MIN/1.73M2
GLUCOSE SERPL-MCNC: 86 MG/DL (ref 74–99)
HBA1C MFR BLD: 5.2 % (ref 4–6)
HBV SURFACE AB SERPL IA-ACNC: <3.5 MIU/ML
HCT VFR BLD AUTO: 43.2 % (ref 36–46)
HDLC SERPL-MCNC: 51 MG/DL
HGB BLD-MCNC: 14.3 G/DL (ref 12–16)
INSULIN COMMENT: NORMAL
INSULIN REFERENCE RANGE:: NORMAL
INSULIN: 5 MU/L
LDLC SERPL CALC-MCNC: 101 MG/DL (ref 0–100)
MAGNESIUM SERPL-MCNC: 2 MG/DL (ref 1.6–2.6)
MCH RBC QN AUTO: 29.6 PG (ref 26–34)
MCHC RBC AUTO-ENTMCNC: 33.1 G/DL (ref 31–37)
MCV RBC AUTO: 89.3 FL (ref 80–100)
PLATELET # BLD AUTO: 312 K/UL (ref 150–450)
PMV BLD AUTO: 7.5 FL (ref 6–12)
POTASSIUM SERPL-SCNC: 4.3 MMOL/L (ref 3.7–5.3)
PROT SERPL-MCNC: 7.3 G/DL (ref 6.6–8.7)
RBC # BLD AUTO: 4.84 M/UL (ref 4–5.2)
SODIUM SERPL-SCNC: 141 MMOL/L (ref 136–145)
TRIGL SERPL-MCNC: 91 MG/DL (ref 0–149)
TSH SERPL DL<=0.05 MIU/L-ACNC: 1.66 UIU/ML (ref 0.27–4.2)
VIT B12 SERPL-MCNC: 516 PG/ML (ref 232–1245)
WBC OTHER # BLD: 6.9 K/UL (ref 3.5–11)

## 2025-04-07 PROCEDURE — 82306 VITAMIN D 25 HYDROXY: CPT

## 2025-04-07 PROCEDURE — 86317 IMMUNOASSAY INFECTIOUS AGENT: CPT

## 2025-04-07 PROCEDURE — 82607 VITAMIN B-12: CPT

## 2025-04-07 PROCEDURE — 83036 HEMOGLOBIN GLYCOSYLATED A1C: CPT

## 2025-04-07 PROCEDURE — 83735 ASSAY OF MAGNESIUM: CPT

## 2025-04-07 PROCEDURE — 80053 COMPREHEN METABOLIC PANEL: CPT

## 2025-04-07 PROCEDURE — 80061 LIPID PANEL: CPT

## 2025-04-07 PROCEDURE — 82746 ASSAY OF FOLIC ACID SERUM: CPT

## 2025-04-07 PROCEDURE — 36415 COLL VENOUS BLD VENIPUNCTURE: CPT

## 2025-04-07 PROCEDURE — 85027 COMPLETE CBC AUTOMATED: CPT

## 2025-04-07 PROCEDURE — 86787 VARICELLA-ZOSTER ANTIBODY: CPT

## 2025-04-07 PROCEDURE — 84443 ASSAY THYROID STIM HORMONE: CPT

## 2025-04-07 PROCEDURE — 83525 ASSAY OF INSULIN: CPT

## 2025-04-08 ENCOUNTER — HOSPITAL ENCOUNTER (OUTPATIENT)
Age: 25
Discharge: HOME | End: 2025-04-08
Payer: MEDICAID

## 2025-04-08 ENCOUNTER — RESULTS FOLLOW-UP (OUTPATIENT)
Dept: FAMILY MEDICINE CLINIC | Age: 25
End: 2025-04-08

## 2025-04-08 ENCOUNTER — RESULTS FOLLOW-UP (OUTPATIENT)
Dept: OBGYN CLINIC | Age: 25
End: 2025-04-08

## 2025-04-08 DIAGNOSIS — N92.6 IRREGULAR MENSES: ICD-10-CM

## 2025-04-08 LAB
AFP SERPL-MCNC: <1.8 UG/L
B-HCG SERPL EIA 3RD IS-ACNC: 0.3 MIU/ML (ref 0–7)
CANCER AG125 SERPL-ACNC: 14 U/ML (ref 0–38)
CEA SERPL-MCNC: 2.1 NG/ML (ref 0–3.8)
ESTRADIOL LEVEL: 47.4 PG/ML
LDH SERPL-CCNC: 154 U/L (ref 135–214)
SEND OUT REPORT: NORMAL
TEST NAME: NORMAL
TESTOST SERPL-MCNC: 52 NG/DL (ref 8–48)

## 2025-04-08 PROCEDURE — 86304 IMMUNOASSAY TUMOR CA 125: CPT

## 2025-04-08 PROCEDURE — 82378 CARCINOEMBRYONIC ANTIGEN: CPT

## 2025-04-08 PROCEDURE — 81503 ONCO (OVAR) FIVE PROTEINS: CPT

## 2025-04-08 PROCEDURE — 36415 COLL VENOUS BLD VENIPUNCTURE: CPT

## 2025-04-08 PROCEDURE — 82105 ALPHA-FETOPROTEIN SERUM: CPT

## 2025-04-08 PROCEDURE — 83615 LACTATE (LD) (LDH) ENZYME: CPT

## 2025-04-08 PROCEDURE — 84702 CHORIONIC GONADOTROPIN TEST: CPT

## 2025-04-08 PROCEDURE — 82670 ASSAY OF TOTAL ESTRADIOL: CPT

## 2025-04-08 PROCEDURE — 83520 IMMUNOASSAY QUANT NOS NONAB: CPT

## 2025-04-08 PROCEDURE — 86336 INHIBIN A: CPT

## 2025-04-08 PROCEDURE — 84403 ASSAY OF TOTAL TESTOSTERONE: CPT

## 2025-04-08 NOTE — RESULT ENCOUNTER NOTE
Please notify patient that vitamin D is low, recommend over-the-counter supplement, at least 2000 units daily.    LDL is just a little high, we will keep an eye on this.    Not immune to hepatitis B would benefit from utilization if interested.    Other labs look to be within normal limits at this time.

## 2025-04-09 ENCOUNTER — OFFICE VISIT (OUTPATIENT)
Dept: OBGYN CLINIC | Age: 25
End: 2025-04-09
Payer: MEDICAID

## 2025-04-09 VITALS
SYSTOLIC BLOOD PRESSURE: 118 MMHG | DIASTOLIC BLOOD PRESSURE: 66 MMHG | WEIGHT: 159 LBS | BODY MASS INDEX: 30.02 KG/M2 | HEIGHT: 61 IN

## 2025-04-09 DIAGNOSIS — N83.201 RIGHT OVARIAN CYST: ICD-10-CM

## 2025-04-09 DIAGNOSIS — N83.202 LEFT OVARIAN CYST: Primary | ICD-10-CM

## 2025-04-09 DIAGNOSIS — N92.6 IRREGULAR MENSES: ICD-10-CM

## 2025-04-09 LAB — VZV IGG SER QL IA: 2

## 2025-04-09 PROCEDURE — 99213 OFFICE O/P EST LOW 20 MIN: CPT | Performed by: OBSTETRICS & GYNECOLOGY

## 2025-04-09 ASSESSMENT — PATIENT HEALTH QUESTIONNAIRE - PHQ9
SUM OF ALL RESPONSES TO PHQ QUESTIONS 1-9: 0
SUM OF ALL RESPONSES TO PHQ QUESTIONS 1-9: 0
1. LITTLE INTEREST OR PLEASURE IN DOING THINGS: NOT AT ALL
SUM OF ALL RESPONSES TO PHQ9 QUESTIONS 1 & 2: 0
2. FEELING DOWN, DEPRESSED OR HOPELESS: NOT AT ALL
SUM OF ALL RESPONSES TO PHQ QUESTIONS 1-9: 0
2. FEELING DOWN, DEPRESSED OR HOPELESS: NOT AT ALL
SUM OF ALL RESPONSES TO PHQ QUESTIONS 1-9: 0
1. LITTLE INTEREST OR PLEASURE IN DOING THINGS: NOT AT ALL

## 2025-04-09 NOTE — PROGRESS NOTES
Yvonne Ching  2025    YOB: 2000          The patient was seen today. She is here regarding follow up on MRI/ labs. Pt states her periods are regular. Pt had a left ovarian cyst which was resolved on MRI. Pt now with a rightovarian cyst. Pt with hydrosalpinx bilateral but left improved. Pt wishes to have kids but not at this time. Pt with normal tumor markers. Pt declines any surgical intervention at this time. Pt wishes a repeat sono 6 months. Pt states her pelvic pain is resolved Her bowels are regular and she is voiding without difficulty.     HPI:  Yvonne Ching is a 24 y.o. female        OB History    Para Term  AB Living   2 0 0 0 2 0   SAB IAB Ectopic Molar Multiple Live Births   2 0 0 0 0 0      # Outcome Date GA Lbr Aly/2nd Weight Sex Type Anes PTL Lv   2 SAB 2022           1 2019               Past Medical History:   Diagnosis Date    Allergic rhinitis     Anxiety     Asthma     Depression        Past Surgical History:   Procedure Laterality Date    TONSILLECTOMY         Family History   Problem Relation Age of Onset    Asthma Mother     Diabetes Mother     Cancer Maternal Aunt     Cancer Maternal Grandfather         lung    Cancer Paternal Grandmother         lung       Social History     Socioeconomic History    Marital status:      Spouse name: Not on file    Number of children: Not on file    Years of education: Not on file    Highest education level: Not on file   Occupational History    Not on file   Tobacco Use    Smoking status: Every Day     Current packs/day: 1.00     Average packs/day: 1 pack/day for 3.0 years (3.0 ttl pk-yrs)     Types: Cigarettes    Smokeless tobacco: Never   Vaping Use    Vaping status: Never Used   Substance and Sexual Activity    Alcohol use: No    Drug use: Yes     Types: Marijuana (Weed)    Sexual activity: Yes     Partners: Female   Other Topics Concern    Not on file   Social History Narrative    Not on file

## 2025-04-10 LAB
INHIBIN A SERPL-MCNC: 5.9 PG/ML
INHIBIN B SERPL-MCNC: 80 PG/ML (ref 8–223)

## 2025-04-10 NOTE — RESULT ENCOUNTER NOTE
Just working on diet changes, limiting fatty/friend foods, avoid pop, limit sugar. Will keep an eye on this. It is not concerning high.

## 2025-04-15 LAB
CANCER AG125 SERPL-ACNC: 14
MALIGNANCY RISK, OVA1, MENOPAUSE STATUS: NORMAL
MALIGNANCY RISK, PELVIC MASS, OVA1 SCORE: 3.1
PATHOLOGY STUDY: NORMAL
SCORE: NORMAL
SEND OUT REPORT: NORMAL
TEST NAME: NORMAL

## 2025-06-29 DIAGNOSIS — J45.20 MILD INTERMITTENT ASTHMA WITHOUT COMPLICATION: ICD-10-CM

## 2025-06-29 NOTE — TELEPHONE ENCOUNTER
Please Approve or Refuse.  Send to Pharmacy per Pt's Request:      Next Visit Date:  Visit date not found   Last Visit Date: 3/10/2025    Hemoglobin A1C (%)   Date Value   04/07/2025 5.2   04/03/2025 5.2             ( goal A1C is < 7)   BP Readings from Last 3 Encounters:   04/09/25 118/66   03/10/25 130/80   01/31/25 114/68          (goal 120/80)  BUN   Date Value Ref Range Status   04/07/2025 9 6 - 20 mg/dL Final     Creatinine   Date Value Ref Range Status   04/07/2025 0.8 0.7 - 1.2 mg/dL Final     Potassium   Date Value Ref Range Status   04/07/2025 4.3 3.7 - 5.3 mmol/L Final

## 2025-06-30 RX ORDER — ALBUTEROL SULFATE 90 UG/1
INHALANT RESPIRATORY (INHALATION)
Qty: 18 G | Refills: 0 | Status: SHIPPED | OUTPATIENT
Start: 2025-06-30

## 2025-07-14 ENCOUNTER — TELEPHONE (OUTPATIENT)
Dept: FAMILY MEDICINE CLINIC | Age: 25
End: 2025-07-14

## 2025-07-14 DIAGNOSIS — J45.20 MILD INTERMITTENT ASTHMA WITHOUT COMPLICATION: ICD-10-CM

## 2025-07-14 RX ORDER — ALBUTEROL SULFATE 90 UG/1
2 INHALANT RESPIRATORY (INHALATION) EVERY 6 HOURS PRN
Qty: 18 G | Refills: 0 | Status: SHIPPED | OUTPATIENT
Start: 2025-07-14 | End: 2025-10-12

## 2025-07-14 NOTE — TELEPHONE ENCOUNTER
[place] she wanted to know if you were able to send her in a new one.

## 2025-08-27 ENCOUNTER — RESULTS FOLLOW-UP (OUTPATIENT)
Dept: OBGYN CLINIC | Age: 25
End: 2025-08-27

## 2025-08-27 DIAGNOSIS — J45.20 MILD INTERMITTENT ASTHMA WITHOUT COMPLICATION: ICD-10-CM

## 2025-08-27 RX ORDER — ALBUTEROL SULFATE 90 UG/1
2 INHALANT RESPIRATORY (INHALATION) EVERY 6 HOURS PRN
Qty: 18 G | Refills: 0 | Status: SHIPPED | OUTPATIENT
Start: 2025-08-27 | End: 2025-11-25

## 2025-09-03 ENCOUNTER — PATIENT MESSAGE (OUTPATIENT)
Dept: FAMILY MEDICINE CLINIC | Age: 25
End: 2025-09-03

## 2025-09-03 ENCOUNTER — OFFICE VISIT (OUTPATIENT)
Dept: FAMILY MEDICINE CLINIC | Age: 25
End: 2025-09-03
Payer: MEDICAID

## 2025-09-03 VITALS
BODY MASS INDEX: 30.96 KG/M2 | WEIGHT: 164 LBS | SYSTOLIC BLOOD PRESSURE: 99 MMHG | HEIGHT: 61 IN | DIASTOLIC BLOOD PRESSURE: 69 MMHG

## 2025-09-03 DIAGNOSIS — J30.2 SEASONAL ALLERGIES: ICD-10-CM

## 2025-09-03 DIAGNOSIS — H66.002 ACUTE SUPPURATIVE OTITIS MEDIA OF LEFT EAR WITHOUT SPONTANEOUS RUPTURE OF TYMPANIC MEMBRANE, RECURRENCE NOT SPECIFIED: Primary | ICD-10-CM

## 2025-09-03 PROCEDURE — 99213 OFFICE O/P EST LOW 20 MIN: CPT | Performed by: NURSE PRACTITIONER

## 2025-09-03 RX ORDER — CHOLECALCIFEROL (VITAMIN D3) 1250 MCG
CAPSULE ORAL
COMMUNITY

## 2025-09-03 RX ORDER — FLUTICASONE PROPIONATE 50 MCG
2 SPRAY, SUSPENSION (ML) NASAL DAILY
Qty: 16 G | Refills: 0 | Status: SHIPPED | OUTPATIENT
Start: 2025-09-03

## 2025-09-03 RX ORDER — CETIRIZINE HYDROCHLORIDE 10 MG/1
10 TABLET ORAL DAILY
Qty: 30 TABLET | Refills: 0 | Status: SHIPPED | OUTPATIENT
Start: 2025-09-03

## 2025-09-03 RX ORDER — AMOXICILLIN 875 MG/1
875 TABLET, COATED ORAL 2 TIMES DAILY
Qty: 20 TABLET | Refills: 0 | Status: SHIPPED | OUTPATIENT
Start: 2025-09-03 | End: 2025-09-04

## 2025-09-03 ASSESSMENT — ENCOUNTER SYMPTOMS
CONSTIPATION: 0
ABDOMINAL DISTENTION: 0
WHEEZING: 0
BACK PAIN: 0
BLOOD IN STOOL: 0
SORE THROAT: 1
ABDOMINAL PAIN: 0
CHEST TIGHTNESS: 0
SHORTNESS OF BREATH: 0
COUGH: 0
DIARRHEA: 0
VOMITING: 0
NAUSEA: 0

## 2025-09-04 RX ORDER — AMOXICILLIN 400 MG/5ML
800 POWDER, FOR SUSPENSION ORAL 3 TIMES DAILY
Qty: 150 ML | Refills: 0 | Status: SHIPPED | OUTPATIENT
Start: 2025-09-04 | End: 2025-09-05

## 2025-09-05 RX ORDER — AMOXICILLIN 400 MG/5ML
800 POWDER, FOR SUSPENSION ORAL 3 TIMES DAILY
Qty: 150 ML | Refills: 0 | Status: SHIPPED | OUTPATIENT
Start: 2025-09-05 | End: 2025-09-15